# Patient Record
Sex: FEMALE | Race: BLACK OR AFRICAN AMERICAN | NOT HISPANIC OR LATINO
[De-identification: names, ages, dates, MRNs, and addresses within clinical notes are randomized per-mention and may not be internally consistent; named-entity substitution may affect disease eponyms.]

---

## 2017-02-17 DIAGNOSIS — N20.0 CALCULUS OF KIDNEY: ICD-10-CM

## 2017-03-08 ENCOUNTER — FORM ENCOUNTER (OUTPATIENT)
Age: 62
End: 2017-03-08

## 2017-03-09 ENCOUNTER — APPOINTMENT (OUTPATIENT)
Dept: CT IMAGING | Facility: IMAGING CENTER | Age: 62
End: 2017-03-09

## 2017-03-09 ENCOUNTER — OUTPATIENT (OUTPATIENT)
Dept: OUTPATIENT SERVICES | Facility: HOSPITAL | Age: 62
LOS: 1 days | End: 2017-03-09
Payer: MEDICAID

## 2017-03-09 DIAGNOSIS — N20.0 CALCULUS OF KIDNEY: ICD-10-CM

## 2017-03-09 DIAGNOSIS — Z98.89 OTHER SPECIFIED POSTPROCEDURAL STATES: Chronic | ICD-10-CM

## 2017-04-13 PROCEDURE — 74176 CT ABD & PELVIS W/O CONTRAST: CPT

## 2017-09-12 ENCOUNTER — RESULT REVIEW (OUTPATIENT)
Age: 62
End: 2017-09-12

## 2019-10-30 ENCOUNTER — RESULT REVIEW (OUTPATIENT)
Age: 64
End: 2019-10-30

## 2020-09-22 ENCOUNTER — APPOINTMENT (OUTPATIENT)
Dept: ORTHOPEDIC SURGERY | Facility: CLINIC | Age: 65
End: 2020-09-22
Payer: MEDICARE

## 2020-09-22 DIAGNOSIS — M54.12 RADICULOPATHY, CERVICAL REGION: ICD-10-CM

## 2020-09-22 PROCEDURE — 72040 X-RAY EXAM NECK SPINE 2-3 VW: CPT

## 2020-09-22 PROCEDURE — 99204 OFFICE O/P NEW MOD 45 MIN: CPT

## 2020-09-22 RX ORDER — IBUPROFEN 800 MG/1
800 TABLET, FILM COATED ORAL 3 TIMES DAILY
Qty: 90 | Refills: 1 | Status: DISCONTINUED | COMMUNITY
Start: 2020-09-22 | End: 2020-09-22

## 2021-02-19 ENCOUNTER — APPOINTMENT (OUTPATIENT)
Dept: ORTHOPEDIC SURGERY | Facility: CLINIC | Age: 66
End: 2021-02-19
Payer: MEDICARE

## 2021-02-19 PROCEDURE — 99072 ADDL SUPL MATRL&STAF TM PHE: CPT

## 2021-02-19 PROCEDURE — 99214 OFFICE O/P EST MOD 30 MIN: CPT

## 2021-06-29 ENCOUNTER — APPOINTMENT (OUTPATIENT)
Dept: ORTHOPEDIC SURGERY | Facility: CLINIC | Age: 66
End: 2021-06-29

## 2021-12-08 ENCOUNTER — RESULT REVIEW (OUTPATIENT)
Age: 66
End: 2021-12-08

## 2021-12-16 ENCOUNTER — APPOINTMENT (OUTPATIENT)
Dept: SURGERY | Facility: CLINIC | Age: 66
End: 2021-12-16
Payer: MEDICARE

## 2021-12-16 VITALS
OXYGEN SATURATION: 95 % | BODY MASS INDEX: 29.95 KG/M2 | HEIGHT: 63 IN | TEMPERATURE: 98.5 F | HEART RATE: 87 BPM | DIASTOLIC BLOOD PRESSURE: 88 MMHG | SYSTOLIC BLOOD PRESSURE: 158 MMHG | WEIGHT: 169 LBS

## 2021-12-16 PROCEDURE — 99203 OFFICE O/P NEW LOW 30 MIN: CPT

## 2021-12-16 NOTE — HISTORY OF PRESENT ILLNESS
[de-identified] : Pt is a 67 y/o F with PMH of HTN, HLD, DM who comes in following PCP referral due to abnormal gallbladder, gallstones, and biliary sludge found on gallbladder US. Pt denies nausea or vomiting. No recent fevers or chills. She reports some occasional mild RLQ pain that is not associated with any exacerbating factors.  No stool changes, although pt reports chronic constipation treated with Senna. No other abdominal pain or obstructive symptoms.

## 2021-12-16 NOTE — REVIEW OF SYSTEMS
[Constipation] : constipation [Limb Swelling] : limb swelling [Negative] : Heme/Lymph [FreeTextEntry9] : bilateral ankle swelling present at the end of day

## 2021-12-16 NOTE — PHYSICAL EXAM
[Normal Breath Sounds] : Normal breath sounds [Normal Heart Sounds] : normal heart sounds [Normal Rate and Rhythm] : normal rate and rhythm [Abdominal Masses] : No abdominal masses [Abdomen Tenderness] : ~T ~M No abdominal tenderness [No HSM] : no hepatosplenomegaly [Alert] : alert [Oriented to Person] : oriented to person [Oriented to Place] : oriented to place [Oriented to Time] : oriented to time [de-identified] : Well-appearing, NAD. [de-identified] : Moist mucous membranes. Sclera anicteric.  [de-identified] : No lymphadenopathy [de-identified] : Soft, non-distended, non-tender. BS +

## 2022-02-08 ENCOUNTER — APPOINTMENT (OUTPATIENT)
Dept: ORTHOPEDIC SURGERY | Facility: CLINIC | Age: 67
End: 2022-02-08
Payer: MEDICARE

## 2022-02-08 VITALS — WEIGHT: 169 LBS | HEIGHT: 63 IN | BODY MASS INDEX: 29.95 KG/M2

## 2022-02-08 PROCEDURE — 99214 OFFICE O/P EST MOD 30 MIN: CPT

## 2022-03-21 ENCOUNTER — RESULT REVIEW (OUTPATIENT)
Age: 67
End: 2022-03-21

## 2022-03-21 ENCOUNTER — OUTPATIENT (OUTPATIENT)
Dept: OUTPATIENT SERVICES | Facility: HOSPITAL | Age: 67
LOS: 1 days | End: 2022-03-21
Payer: MEDICARE

## 2022-03-21 ENCOUNTER — APPOINTMENT (OUTPATIENT)
Dept: MRI IMAGING | Facility: CLINIC | Age: 67
End: 2022-03-21
Payer: MEDICARE

## 2022-03-21 DIAGNOSIS — Z98.89 OTHER SPECIFIED POSTPROCEDURAL STATES: Chronic | ICD-10-CM

## 2022-03-21 DIAGNOSIS — M47.812 SPONDYLOSIS WITHOUT MYELOPATHY OR RADICULOPATHY, CERVICAL REGION: ICD-10-CM

## 2022-03-21 DIAGNOSIS — M50.30 OTHER CERVICAL DISC DEGENERATION, UNSPECIFIED CERVICAL REGION: ICD-10-CM

## 2022-03-21 PROCEDURE — 72141 MRI NECK SPINE W/O DYE: CPT

## 2022-03-21 PROCEDURE — 72141 MRI NECK SPINE W/O DYE: CPT | Mod: 26

## 2022-11-16 ENCOUNTER — APPOINTMENT (OUTPATIENT)
Dept: ORTHOPEDIC SURGERY | Facility: CLINIC | Age: 67
End: 2022-11-16

## 2022-11-16 DIAGNOSIS — M54.50 LOW BACK PAIN, UNSPECIFIED: ICD-10-CM

## 2022-11-28 ENCOUNTER — APPOINTMENT (OUTPATIENT)
Dept: ORTHOPEDIC SURGERY | Facility: CLINIC | Age: 67
End: 2022-11-28
Payer: MEDICARE

## 2022-11-28 VITALS — HEIGHT: 63 IN | BODY MASS INDEX: 29.95 KG/M2 | WEIGHT: 169 LBS

## 2022-11-28 DIAGNOSIS — M47.816 SPONDYLOSIS W/OUT MYELOPATHY OR RADICULOPATHY, CERVICAL REGION: ICD-10-CM

## 2022-11-28 DIAGNOSIS — M47.812 SPONDYLOSIS W/OUT MYELOPATHY OR RADICULOPATHY, CERVICAL REGION: ICD-10-CM

## 2022-11-28 PROCEDURE — 72100 X-RAY EXAM L-S SPINE 2/3 VWS: CPT

## 2022-11-28 PROCEDURE — 99214 OFFICE O/P EST MOD 30 MIN: CPT

## 2022-11-28 RX ORDER — INSULIN DETEMIR 100 [IU]/ML
100 INJECTION, SOLUTION SUBCUTANEOUS
Qty: 45 | Refills: 0 | Status: ACTIVE | COMMUNITY
Start: 2022-10-08

## 2022-11-28 RX ORDER — PEN NEEDLE, DIABETIC 29 G X1/2"
32G X 4 MM NEEDLE, DISPOSABLE MISCELLANEOUS
Qty: 300 | Refills: 0 | Status: ACTIVE | COMMUNITY
Start: 2022-08-26

## 2022-12-05 ENCOUNTER — APPOINTMENT (OUTPATIENT)
Dept: ORTHOPEDIC SURGERY | Facility: CLINIC | Age: 67
End: 2022-12-05

## 2022-12-08 ENCOUNTER — APPOINTMENT (OUTPATIENT)
Dept: CARDIOLOGY | Facility: CLINIC | Age: 67
End: 2022-12-08

## 2022-12-08 ENCOUNTER — NON-APPOINTMENT (OUTPATIENT)
Age: 67
End: 2022-12-08

## 2022-12-08 VITALS
OXYGEN SATURATION: 95 % | TEMPERATURE: 97.8 F | BODY MASS INDEX: 29.77 KG/M2 | HEART RATE: 87 BPM | WEIGHT: 168 LBS | SYSTOLIC BLOOD PRESSURE: 130 MMHG | DIASTOLIC BLOOD PRESSURE: 80 MMHG | HEIGHT: 63 IN

## 2022-12-08 DIAGNOSIS — Z87.09 PERSONAL HISTORY OF OTHER DISEASES OF THE RESPIRATORY SYSTEM: ICD-10-CM

## 2022-12-08 DIAGNOSIS — Z86.79 PERSONAL HISTORY OF OTHER DISEASES OF THE CIRCULATORY SYSTEM: ICD-10-CM

## 2022-12-08 PROCEDURE — 99214 OFFICE O/P EST MOD 30 MIN: CPT | Mod: 25

## 2022-12-08 PROCEDURE — 93000 ELECTROCARDIOGRAM COMPLETE: CPT

## 2022-12-08 RX ORDER — ROSUVASTATIN CALCIUM 5 MG/1
5 TABLET, FILM COATED ORAL
Qty: 90 | Refills: 0 | Status: DISCONTINUED | COMMUNITY
Start: 2022-08-19 | End: 2022-12-08

## 2022-12-20 NOTE — DISCUSSION/SUMMARY
[FreeTextEntry1] : Continue glycemic management for type 2 diabetes.  Stay on losartan plus triamterene hydrochlorothiazide for blood pressure control.  I have ordered an echocardiogram to assess LV function and valvular status.  Heart coronary CT scan with IV contrast ordered to assess underlying coronaries burden.  Heart healthy diet and as tolerated fitness with some weight loss over time encouraged.  We will call the patient with test results and followup with you for general care.  Follow-up with you for care

## 2022-12-20 NOTE — HISTORY OF PRESENT ILLNESS
[FreeTextEntry1] : Claudette is 67 years of age with history of type 2 diabetes, asthma, degenerative joint disease.  She has difficulty with ambulation secondary to cervical radiculopathy, cervical & lumbar spinal syndrome, overweight by BMI who comes in for cardiac assessment. She has an abnormal ECG possible old anterior wall MI age-indeterminate and low voltage QRS with nonspecific T wave abnormality. She also complains of palpitations.  At times, chest pain also noted.

## 2022-12-20 NOTE — CARDIOLOGY SUMMARY
[de-identified] : Sinus  Rhythm  Low voltage in precordial leads.   -Poor R-wave progression -may be secondary to pulmonary disease   consider old anterior infarct.   -  Nonspecific T-abnormality.   ABNORMAL

## 2022-12-23 ENCOUNTER — APPOINTMENT (OUTPATIENT)
Dept: CARDIOLOGY | Facility: CLINIC | Age: 67
End: 2022-12-23
Payer: MEDICARE

## 2022-12-23 PROCEDURE — 93306 TTE W/DOPPLER COMPLETE: CPT

## 2023-03-06 ENCOUNTER — APPOINTMENT (OUTPATIENT)
Dept: ORTHOPEDIC SURGERY | Facility: CLINIC | Age: 68
End: 2023-03-06

## 2023-05-05 ENCOUNTER — NON-APPOINTMENT (OUTPATIENT)
Age: 68
End: 2023-05-05

## 2023-05-05 ENCOUNTER — APPOINTMENT (OUTPATIENT)
Dept: CARDIOLOGY | Facility: CLINIC | Age: 68
End: 2023-05-05
Payer: MEDICARE

## 2023-05-05 VITALS
OXYGEN SATURATION: 96 % | SYSTOLIC BLOOD PRESSURE: 130 MMHG | HEIGHT: 63 IN | BODY MASS INDEX: 30.12 KG/M2 | HEART RATE: 87 BPM | WEIGHT: 170 LBS | DIASTOLIC BLOOD PRESSURE: 78 MMHG

## 2023-05-05 DIAGNOSIS — Z86.39 PERSONAL HISTORY OF OTHER ENDOCRINE, NUTRITIONAL AND METABOLIC DISEASE: ICD-10-CM

## 2023-05-05 PROCEDURE — 93242 EXT ECG>48HR<7D RECORDING: CPT

## 2023-05-05 PROCEDURE — 93000 ELECTROCARDIOGRAM COMPLETE: CPT

## 2023-05-05 PROCEDURE — 99214 OFFICE O/P EST MOD 30 MIN: CPT | Mod: 25

## 2023-05-05 RX ORDER — TIZANIDINE 2 MG/1
2 TABLET ORAL
Qty: 30 | Refills: 0 | Status: DISCONTINUED | COMMUNITY
Start: 2022-10-24 | End: 2023-05-05

## 2023-05-05 RX ORDER — HYDROCORTISONE 25 MG/G
2.5 CREAM TOPICAL
Qty: 28 | Refills: 0 | Status: DISCONTINUED | COMMUNITY
Start: 2022-08-27 | End: 2023-05-05

## 2023-05-05 RX ORDER — INSULIN ASPART 100 [IU]/ML
100 INJECTION, SOLUTION INTRAVENOUS; SUBCUTANEOUS
Qty: 15 | Refills: 0 | Status: DISCONTINUED | COMMUNITY
Start: 2022-08-12 | End: 2023-05-05

## 2023-05-05 RX ORDER — TIZANIDINE 4 MG/1
4 TABLET ORAL
Qty: 90 | Refills: 0 | Status: DISCONTINUED | COMMUNITY
Start: 2022-11-30 | End: 2023-05-05

## 2023-05-05 RX ORDER — GABAPENTIN 100 MG/1
100 CAPSULE ORAL
Qty: 90 | Refills: 0 | Status: DISCONTINUED | COMMUNITY
Start: 2022-08-12 | End: 2023-05-05

## 2023-05-05 RX ORDER — MELOXICAM 15 MG/1
15 TABLET ORAL
Qty: 30 | Refills: 1 | Status: DISCONTINUED | COMMUNITY
Start: 2020-09-22 | End: 2023-05-05

## 2023-05-05 RX ORDER — MELOXICAM 15 MG/1
15 TABLET ORAL
Qty: 30 | Refills: 1 | Status: DISCONTINUED | COMMUNITY
Start: 2021-02-19 | End: 2023-05-05

## 2023-05-05 RX ORDER — MELOXICAM 15 MG/1
15 TABLET ORAL DAILY
Qty: 30 | Refills: 1 | Status: DISCONTINUED | COMMUNITY
Start: 2021-02-16 | End: 2023-05-05

## 2023-05-05 RX ORDER — ASPIRIN 81 MG
81 TABLET, DELAYED RELEASE (ENTERIC COATED) ORAL DAILY
Refills: 0 | Status: ACTIVE | COMMUNITY

## 2023-05-05 RX ORDER — DICLOFENAC SODIUM 16.05 MG/ML
1.5 SOLUTION TOPICAL
Qty: 1 | Refills: 1 | Status: DISCONTINUED | COMMUNITY
Start: 2022-11-28 | End: 2023-05-05

## 2023-05-05 RX ORDER — DOCUSATE SODIUM 100 MG/1
100 CAPSULE, LIQUID FILLED ORAL
Qty: 60 | Refills: 0 | Status: DISCONTINUED | COMMUNITY
Start: 2022-06-27 | End: 2023-05-05

## 2023-05-05 RX ORDER — NAPROXEN 500 MG/1
500 TABLET ORAL
Qty: 30 | Refills: 0 | Status: DISCONTINUED | COMMUNITY
Start: 2022-10-24 | End: 2023-05-05

## 2023-05-05 RX ORDER — CYCLOBENZAPRINE HYDROCHLORIDE 5 MG/1
5 TABLET, FILM COATED ORAL
Qty: 30 | Refills: 1 | Status: DISCONTINUED | COMMUNITY
Start: 2022-11-28 | End: 2023-05-05

## 2023-05-05 RX ORDER — NEOMYCIN SULFATE, POLYMYXIN B SULFATE AND HYDROCORTISONE 3.5; 10000; 1 MG/ML; [IU]/ML; MG/ML
3.5-10000-1 SOLUTION AURICULAR (OTIC)
Qty: 10 | Refills: 0 | Status: DISCONTINUED | COMMUNITY
Start: 2022-06-27 | End: 2023-05-05

## 2023-05-05 RX ORDER — MELOXICAM 15 MG/1
15 TABLET ORAL
Qty: 30 | Refills: 1 | Status: DISCONTINUED | COMMUNITY
Start: 2022-02-08 | End: 2023-05-05

## 2023-05-22 ENCOUNTER — APPOINTMENT (OUTPATIENT)
Dept: ORTHOPEDIC SURGERY | Facility: CLINIC | Age: 68
End: 2023-05-22
Payer: MEDICARE

## 2023-05-22 VITALS — WEIGHT: 170 LBS | HEIGHT: 63 IN | BODY MASS INDEX: 30.12 KG/M2

## 2023-05-22 DIAGNOSIS — M50.30 OTHER CERVICAL DISC DEGENERATION, UNSPECIFIED CERVICAL REGION: ICD-10-CM

## 2023-05-22 DIAGNOSIS — M48.02 SPINAL STENOSIS, CERVICAL REGION: ICD-10-CM

## 2023-05-22 DIAGNOSIS — M50.20 OTHER CERVICAL DISC DISPLACEMENT, UNSPECIFIED CERVICAL REGION: ICD-10-CM

## 2023-05-22 PROCEDURE — 99214 OFFICE O/P EST MOD 30 MIN: CPT

## 2023-05-28 PROBLEM — Z86.39 HISTORY OF TYPE 2 DIABETES MELLITUS: Status: RESOLVED | Noted: 2022-12-08 | Resolved: 2023-05-28

## 2023-05-28 NOTE — HISTORY OF PRESENT ILLNESS
[FreeTextEntry1] : Claudette is 68 years of age with BMI of 30, asthma, type 2 diabetes, degenerative joint disease, abnormal ECG and intermittent palpitations of late.  She is eating generally healthy and taking medications as prescribed.

## 2023-05-28 NOTE — DISCUSSION/SUMMARY
[___ Week(s)] : in [unfilled] week(s) [FreeTextEntry3] : Stress test, 5-day Zio patch Holter monitor, 3-month follow-up visit. [FreeTextEntry1] : Continue on aspirin along with current antihypertensive regimen of triamterene hydrochlorothiazide and losartan for blood pressure management and cardiac risk reduction.  Continue on Levemir/insulin for glycemic management of type 2 diabetes.  Sodium and starch restriction again emphasized.  Heart healthy diet and lifestyle encouraged.  I will order a regular treadmill stress test to assess cardiac fitness and rule out any provocable ECG changes as well as check vital assessment to exercise.  To assess her palpitation/arrhythmia burden I have ordered a 5-day Zio patch Holter monitor.  We will call the patient with test results and followup with you for general care.  I recommended a heart healthy lifestyle including a low-saturated fat, low cholesterol diet with improved aerobic physical fitness over time for cardiovascular benefits.  Carbohydrate and sodium restriction along with weight loss over time encouraged.  We will call the patient with test results and followup with you for general care.

## 2023-05-28 NOTE — CARDIOLOGY SUMMARY
[de-identified] : Sinus  Rhythm  - occasional ectopic ventricular beat   \par Low voltage in precordial leads. \par  -  Diffuse nonspecific T-abnormality. \par \par ABNORMAL \par

## 2023-06-08 ENCOUNTER — APPOINTMENT (OUTPATIENT)
Dept: CARDIOLOGY | Facility: CLINIC | Age: 68
End: 2023-06-08

## 2023-06-12 ENCOUNTER — INPATIENT (INPATIENT)
Facility: HOSPITAL | Age: 68
LOS: 10 days | Discharge: HOME HEALTH | End: 2023-06-23
Attending: STUDENT IN AN ORGANIZED HEALTH CARE EDUCATION/TRAINING PROGRAM | Admitting: STUDENT IN AN ORGANIZED HEALTH CARE EDUCATION/TRAINING PROGRAM
Payer: MEDICARE

## 2023-06-12 VITALS
WEIGHT: 169.98 LBS | OXYGEN SATURATION: 98 % | SYSTOLIC BLOOD PRESSURE: 157 MMHG | DIASTOLIC BLOOD PRESSURE: 77 MMHG | HEART RATE: 97 BPM | HEIGHT: 63 IN | RESPIRATION RATE: 18 BRPM | TEMPERATURE: 98 F

## 2023-06-12 DIAGNOSIS — Z98.89 OTHER SPECIFIED POSTPROCEDURAL STATES: Chronic | ICD-10-CM

## 2023-06-12 LAB
ALBUMIN SERPL ELPH-MCNC: 3.8 G/DL — SIGNIFICANT CHANGE UP (ref 3.3–5)
ALP SERPL-CCNC: 80 U/L — SIGNIFICANT CHANGE UP (ref 40–120)
ALT FLD-CCNC: 19 U/L — SIGNIFICANT CHANGE UP (ref 12–78)
ANION GAP SERPL CALC-SCNC: 5 MMOL/L — SIGNIFICANT CHANGE UP (ref 5–17)
APPEARANCE UR: CLEAR — SIGNIFICANT CHANGE UP
AST SERPL-CCNC: 8 U/L — LOW (ref 15–37)
BASOPHILS # BLD AUTO: 0.03 K/UL — SIGNIFICANT CHANGE UP (ref 0–0.2)
BASOPHILS NFR BLD AUTO: 0.3 % — SIGNIFICANT CHANGE UP (ref 0–2)
BILIRUB SERPL-MCNC: 0.6 MG/DL — SIGNIFICANT CHANGE UP (ref 0.2–1.2)
BILIRUB UR-MCNC: NEGATIVE — SIGNIFICANT CHANGE UP
BUN SERPL-MCNC: 19 MG/DL — SIGNIFICANT CHANGE UP (ref 7–23)
CALCIUM SERPL-MCNC: 10.5 MG/DL — HIGH (ref 8.5–10.1)
CHLORIDE SERPL-SCNC: 102 MMOL/L — SIGNIFICANT CHANGE UP (ref 96–108)
CO2 SERPL-SCNC: 29 MMOL/L — SIGNIFICANT CHANGE UP (ref 22–31)
COLOR SPEC: YELLOW — SIGNIFICANT CHANGE UP
CREAT SERPL-MCNC: 1.49 MG/DL — HIGH (ref 0.5–1.3)
DIFF PNL FLD: NEGATIVE — SIGNIFICANT CHANGE UP
EGFR: 38 ML/MIN/1.73M2 — LOW
EOSINOPHIL # BLD AUTO: 0.04 K/UL — SIGNIFICANT CHANGE UP (ref 0–0.5)
EOSINOPHIL NFR BLD AUTO: 0.3 % — SIGNIFICANT CHANGE UP (ref 0–6)
GLUCOSE SERPL-MCNC: 291 MG/DL — HIGH (ref 70–99)
GLUCOSE UR QL: 250 MG/DL
HCT VFR BLD CALC: 34.7 % — SIGNIFICANT CHANGE UP (ref 34.5–45)
HGB BLD-MCNC: 11.3 G/DL — LOW (ref 11.5–15.5)
IMM GRANULOCYTES NFR BLD AUTO: 0.3 % — SIGNIFICANT CHANGE UP (ref 0–0.9)
KETONES UR-MCNC: ABNORMAL
LACTATE SERPL-SCNC: 1.5 MMOL/L — SIGNIFICANT CHANGE UP (ref 0.7–2)
LEUKOCYTE ESTERASE UR-ACNC: ABNORMAL
LIDOCAIN IGE QN: 43 U/L — LOW (ref 73–393)
LYMPHOCYTES # BLD AUTO: 1.68 K/UL — SIGNIFICANT CHANGE UP (ref 1–3.3)
LYMPHOCYTES # BLD AUTO: 14.4 % — SIGNIFICANT CHANGE UP (ref 13–44)
MCHC RBC-ENTMCNC: 26.2 PG — LOW (ref 27–34)
MCHC RBC-ENTMCNC: 32.6 G/DL — SIGNIFICANT CHANGE UP (ref 32–36)
MCV RBC AUTO: 80.5 FL — SIGNIFICANT CHANGE UP (ref 80–100)
MONOCYTES # BLD AUTO: 0.78 K/UL — SIGNIFICANT CHANGE UP (ref 0–0.9)
MONOCYTES NFR BLD AUTO: 6.7 % — SIGNIFICANT CHANGE UP (ref 2–14)
NEUTROPHILS # BLD AUTO: 9.07 K/UL — HIGH (ref 1.8–7.4)
NEUTROPHILS NFR BLD AUTO: 78 % — HIGH (ref 43–77)
NITRITE UR-MCNC: NEGATIVE — SIGNIFICANT CHANGE UP
NRBC # BLD: 0 /100 WBCS — SIGNIFICANT CHANGE UP (ref 0–0)
PH UR: 7 — SIGNIFICANT CHANGE UP (ref 5–8)
PLATELET # BLD AUTO: 290 K/UL — SIGNIFICANT CHANGE UP (ref 150–400)
POTASSIUM SERPL-MCNC: 5 MMOL/L — SIGNIFICANT CHANGE UP (ref 3.5–5.3)
POTASSIUM SERPL-SCNC: 5 MMOL/L — SIGNIFICANT CHANGE UP (ref 3.5–5.3)
PROT SERPL-MCNC: 8 GM/DL — SIGNIFICANT CHANGE UP (ref 6–8.3)
PROT UR-MCNC: 15 MG/DL
RBC # BLD: 4.31 M/UL — SIGNIFICANT CHANGE UP (ref 3.8–5.2)
RBC # FLD: 11.9 % — SIGNIFICANT CHANGE UP (ref 10.3–14.5)
SODIUM SERPL-SCNC: 136 MMOL/L — SIGNIFICANT CHANGE UP (ref 135–145)
SP GR SPEC: 1.01 — SIGNIFICANT CHANGE UP (ref 1.01–1.02)
UROBILINOGEN FLD QL: NEGATIVE MG/DL — SIGNIFICANT CHANGE UP
WBC # BLD: 11.63 K/UL — HIGH (ref 3.8–10.5)
WBC # FLD AUTO: 11.63 K/UL — HIGH (ref 3.8–10.5)

## 2023-06-12 PROCEDURE — 99285 EMERGENCY DEPT VISIT HI MDM: CPT

## 2023-06-12 PROCEDURE — 93010 ELECTROCARDIOGRAM REPORT: CPT

## 2023-06-12 NOTE — ED PROVIDER NOTE - OBJECTIVE STATEMENT
68-year-old female with history of hypertension diabetes presents to ER from the office of Dr. Arechiga to rule out acute abdominal pathology.  Patient states that she had been having on and off constipation symptoms for about 2 weeks but pain to the abdomen really started the last day or 2.  Denies any fevers or chills noted some nausea no vomiting thus far.

## 2023-06-12 NOTE — ED ADULT NURSE NOTE - OBJECTIVE STATEMENT
Patient A&Ox4 presents to ED sent from PCP c/o generalized abdominal pain that began last night. As per patient the pain is generalized throughout her abdomen and radiates into her chest. Of note, patient has been constipated x 2 weeks with only small, hard BM. Patient denies N/V/D, F/C, SOB, blood in stool, dysuria, hematuria. PMH HTN, DM, NKDA.

## 2023-06-12 NOTE — ED PROVIDER NOTE - NSICDXFAMILYHX_GEN_ALL_CORE_FT
FAMILY HISTORY:  Father  Still living? Unknown  Chronic obstructive pulmonary disease, Age at diagnosis: Age Unknown    Mother  Still living? No  Family history of CKD (chronic kidney disease), Age at diagnosis: Age Unknown  Type 2 diabetes mellitus, Age at diagnosis: Age Unknown

## 2023-06-12 NOTE — ED ADULT NURSE NOTE - NSFALLUNIVINTERV_ED_ALL_ED
Bed/Stretcher in lowest position, wheels locked, appropriate side rails in place/Call bell, personal items and telephone in reach/Instruct patient to call for assistance before getting out of bed/chair/stretcher/Non-slip footwear applied when patient is off stretcher/[place] to call system/Physically safe environment - no spills, clutter or unnecessary equipment/Purposeful proactive rounding/Room/bathroom lighting operational, light cord in reach

## 2023-06-12 NOTE — ED ADULT TRIAGE NOTE - CHIEF COMPLAINT QUOTE
Patient sent from PCP c/o generalized abdominal pain. Denies n/v. States she has been constipated x 2 weeks but has small bm today. Pmh htn, dm.

## 2023-06-12 NOTE — ED PROVIDER NOTE - CLINICAL SUMMARY MEDICAL DECISION MAKING FREE TEXT BOX
Patient with distention and suspicion for cholecystitis noted on CT scan and ultrasound will consult surgical team for further care of patient.    Patient evaluated by surgical service felt clinically as well as by imaging patient has acute cholecystitis would recommend admission for laparoscopic cholecystectomy.  Patient to be admitted to Dr. Gonzalez

## 2023-06-12 NOTE — ED PROVIDER NOTE - NSICDXPASTMEDICALHX_GEN_ALL_CORE_FT
PAST MEDICAL HISTORY:  Allergic asthma     DM (diabetes mellitus)     HTN (hypertension)     Hypertension     Type 2 diabetes mellitus

## 2023-06-13 DIAGNOSIS — K81.0 ACUTE CHOLECYSTITIS: ICD-10-CM

## 2023-06-13 DIAGNOSIS — N17.9 ACUTE KIDNEY FAILURE, UNSPECIFIED: ICD-10-CM

## 2023-06-13 LAB
ALBUMIN SERPL ELPH-MCNC: 3.7 G/DL — SIGNIFICANT CHANGE UP (ref 3.3–5)
ALP SERPL-CCNC: 83 U/L — SIGNIFICANT CHANGE UP (ref 40–120)
ALT FLD-CCNC: 19 U/L — SIGNIFICANT CHANGE UP (ref 12–78)
ANION GAP SERPL CALC-SCNC: 5 MMOL/L — SIGNIFICANT CHANGE UP (ref 5–17)
APTT BLD: 31 SEC — SIGNIFICANT CHANGE UP (ref 27.5–35.5)
AST SERPL-CCNC: 8 U/L — LOW (ref 15–37)
BACTERIA # UR AUTO: ABNORMAL
BILIRUB DIRECT SERPL-MCNC: 0.2 MG/DL — SIGNIFICANT CHANGE UP (ref 0–0.3)
BILIRUB INDIRECT FLD-MCNC: 0.4 MG/DL — SIGNIFICANT CHANGE UP (ref 0.2–1)
BILIRUB SERPL-MCNC: 0.6 MG/DL — SIGNIFICANT CHANGE UP (ref 0.2–1.2)
BLD GP AB SCN SERPL QL: SIGNIFICANT CHANGE UP
BUN SERPL-MCNC: 18 MG/DL — SIGNIFICANT CHANGE UP (ref 7–23)
CALCIUM SERPL-MCNC: 10.2 MG/DL — HIGH (ref 8.5–10.1)
CHLORIDE SERPL-SCNC: 102 MMOL/L — SIGNIFICANT CHANGE UP (ref 96–108)
CO2 SERPL-SCNC: 31 MMOL/L — SIGNIFICANT CHANGE UP (ref 22–31)
CREAT SERPL-MCNC: 1.42 MG/DL — HIGH (ref 0.5–1.3)
EGFR: 40 ML/MIN/1.73M2 — LOW
EPI CELLS # UR: SIGNIFICANT CHANGE UP
FLUAV AG NPH QL: SIGNIFICANT CHANGE UP
FLUBV AG NPH QL: SIGNIFICANT CHANGE UP
GLUCOSE BLDC GLUCOMTR-MCNC: 289 MG/DL — HIGH (ref 70–99)
GLUCOSE BLDC GLUCOMTR-MCNC: 309 MG/DL — HIGH (ref 70–99)
GLUCOSE SERPL-MCNC: 296 MG/DL — HIGH (ref 70–99)
HCT VFR BLD CALC: 33.4 % — LOW (ref 34.5–45)
HGB BLD-MCNC: 10.8 G/DL — LOW (ref 11.5–15.5)
INR BLD: 1.02 RATIO — SIGNIFICANT CHANGE UP (ref 0.88–1.16)
MAGNESIUM SERPL-MCNC: 2.6 MG/DL — SIGNIFICANT CHANGE UP (ref 1.6–2.6)
MCHC RBC-ENTMCNC: 26.3 PG — LOW (ref 27–34)
MCHC RBC-ENTMCNC: 32.3 G/DL — SIGNIFICANT CHANGE UP (ref 32–36)
MCV RBC AUTO: 81.5 FL — SIGNIFICANT CHANGE UP (ref 80–100)
NRBC # BLD: 0 /100 WBCS — SIGNIFICANT CHANGE UP (ref 0–0)
PHOSPHATE SERPL-MCNC: 2.7 MG/DL — SIGNIFICANT CHANGE UP (ref 2.5–4.5)
PLATELET # BLD AUTO: 281 K/UL — SIGNIFICANT CHANGE UP (ref 150–400)
POTASSIUM SERPL-MCNC: 5.1 MMOL/L — SIGNIFICANT CHANGE UP (ref 3.5–5.3)
POTASSIUM SERPL-SCNC: 5.1 MMOL/L — SIGNIFICANT CHANGE UP (ref 3.5–5.3)
PROT SERPL-MCNC: 7.8 GM/DL — SIGNIFICANT CHANGE UP (ref 6–8.3)
PROTHROM AB SERPL-ACNC: 12.1 SEC — SIGNIFICANT CHANGE UP (ref 10.5–13.4)
RBC # BLD: 4.1 M/UL — SIGNIFICANT CHANGE UP (ref 3.8–5.2)
RBC # FLD: 11.8 % — SIGNIFICANT CHANGE UP (ref 10.3–14.5)
RBC CASTS # UR COMP ASSIST: NEGATIVE /HPF — SIGNIFICANT CHANGE UP (ref 0–4)
SARS-COV-2 RNA SPEC QL NAA+PROBE: SIGNIFICANT CHANGE UP
SODIUM SERPL-SCNC: 138 MMOL/L — SIGNIFICANT CHANGE UP (ref 135–145)
WBC # BLD: 11.49 K/UL — HIGH (ref 3.8–10.5)
WBC # FLD AUTO: 11.49 K/UL — HIGH (ref 3.8–10.5)
WBC UR QL: SIGNIFICANT CHANGE UP

## 2023-06-13 PROCEDURE — 74177 CT ABD & PELVIS W/CONTRAST: CPT | Mod: 26,MA

## 2023-06-13 PROCEDURE — 99222 1ST HOSP IP/OBS MODERATE 55: CPT

## 2023-06-13 PROCEDURE — 76705 ECHO EXAM OF ABDOMEN: CPT | Mod: 26

## 2023-06-13 PROCEDURE — 71045 X-RAY EXAM CHEST 1 VIEW: CPT | Mod: 26

## 2023-06-13 RX ORDER — MORPHINE SULFATE 50 MG/1
2 CAPSULE, EXTENDED RELEASE ORAL EVERY 4 HOURS
Refills: 0 | Status: DISCONTINUED | OUTPATIENT
Start: 2023-06-13 | End: 2023-06-15

## 2023-06-13 RX ORDER — PIPERACILLIN AND TAZOBACTAM 4; .5 G/20ML; G/20ML
3.38 INJECTION, POWDER, LYOPHILIZED, FOR SOLUTION INTRAVENOUS ONCE
Refills: 0 | Status: COMPLETED | OUTPATIENT
Start: 2023-06-13 | End: 2023-06-13

## 2023-06-13 RX ORDER — SODIUM CHLORIDE 9 MG/ML
1000 INJECTION, SOLUTION INTRAVENOUS
Refills: 0 | Status: DISCONTINUED | OUTPATIENT
Start: 2023-06-13 | End: 2023-06-15

## 2023-06-13 RX ORDER — AMLODIPINE BESYLATE 2.5 MG/1
10 TABLET ORAL DAILY
Refills: 0 | Status: DISCONTINUED | OUTPATIENT
Start: 2023-06-13 | End: 2023-06-15

## 2023-06-13 RX ORDER — MORPHINE SULFATE 50 MG/1
2 CAPSULE, EXTENDED RELEASE ORAL ONCE
Refills: 0 | Status: DISCONTINUED | OUTPATIENT
Start: 2023-06-13 | End: 2023-06-13

## 2023-06-13 RX ORDER — PIPERACILLIN AND TAZOBACTAM 4; .5 G/20ML; G/20ML
3.38 INJECTION, POWDER, LYOPHILIZED, FOR SOLUTION INTRAVENOUS EVERY 8 HOURS
Refills: 0 | Status: DISCONTINUED | OUTPATIENT
Start: 2023-06-13 | End: 2023-06-15

## 2023-06-13 RX ORDER — LOSARTAN POTASSIUM 100 MG/1
50 TABLET, FILM COATED ORAL DAILY
Refills: 0 | Status: DISCONTINUED | OUTPATIENT
Start: 2023-06-13 | End: 2023-06-15

## 2023-06-13 RX ORDER — HEPARIN SODIUM 5000 [USP'U]/ML
5000 INJECTION INTRAVENOUS; SUBCUTANEOUS EVERY 12 HOURS
Refills: 0 | Status: DISCONTINUED | OUTPATIENT
Start: 2023-06-13 | End: 2023-06-15

## 2023-06-13 RX ORDER — TRIAMTERENE/HYDROCHLOROTHIAZID 75 MG-50MG
1 TABLET ORAL DAILY
Refills: 0 | Status: DISCONTINUED | OUTPATIENT
Start: 2023-06-13 | End: 2023-06-15

## 2023-06-13 RX ORDER — DEXTROSE 50 % IN WATER 50 %
15 SYRINGE (ML) INTRAVENOUS ONCE
Refills: 0 | Status: DISCONTINUED | OUTPATIENT
Start: 2023-06-13 | End: 2023-06-15

## 2023-06-13 RX ORDER — SODIUM CHLORIDE 9 MG/ML
1000 INJECTION, SOLUTION INTRAVENOUS
Refills: 0 | Status: DISCONTINUED | OUTPATIENT
Start: 2023-06-13 | End: 2023-06-14

## 2023-06-13 RX ORDER — DEXTROSE 50 % IN WATER 50 %
25 SYRINGE (ML) INTRAVENOUS ONCE
Refills: 0 | Status: DISCONTINUED | OUTPATIENT
Start: 2023-06-13 | End: 2023-06-15

## 2023-06-13 RX ORDER — DEXTROSE 50 % IN WATER 50 %
12.5 SYRINGE (ML) INTRAVENOUS ONCE
Refills: 0 | Status: DISCONTINUED | OUTPATIENT
Start: 2023-06-13 | End: 2023-06-15

## 2023-06-13 RX ORDER — ALBUTEROL 90 UG/1
2 AEROSOL, METERED ORAL EVERY 6 HOURS
Refills: 0 | Status: DISCONTINUED | OUTPATIENT
Start: 2023-06-13 | End: 2023-06-15

## 2023-06-13 RX ORDER — ACETAMINOPHEN 500 MG
975 TABLET ORAL EVERY 6 HOURS
Refills: 0 | Status: DISCONTINUED | OUTPATIENT
Start: 2023-06-13 | End: 2023-06-14

## 2023-06-13 RX ORDER — GLUCAGON INJECTION, SOLUTION 0.5 MG/.1ML
1 INJECTION, SOLUTION SUBCUTANEOUS ONCE
Refills: 0 | Status: DISCONTINUED | OUTPATIENT
Start: 2023-06-13 | End: 2023-06-15

## 2023-06-13 RX ORDER — SODIUM CHLORIDE 9 MG/ML
1000 INJECTION INTRAMUSCULAR; INTRAVENOUS; SUBCUTANEOUS ONCE
Refills: 0 | Status: COMPLETED | OUTPATIENT
Start: 2023-06-13 | End: 2023-06-13

## 2023-06-13 RX ORDER — INSULIN LISPRO 100/ML
VIAL (ML) SUBCUTANEOUS EVERY 6 HOURS
Refills: 0 | Status: DISCONTINUED | OUTPATIENT
Start: 2023-06-13 | End: 2023-06-15

## 2023-06-13 RX ORDER — ONDANSETRON 8 MG/1
4 TABLET, FILM COATED ORAL EVERY 6 HOURS
Refills: 0 | Status: DISCONTINUED | OUTPATIENT
Start: 2023-06-13 | End: 2023-06-15

## 2023-06-13 RX ADMIN — MORPHINE SULFATE 2 MILLIGRAM(S): 50 CAPSULE, EXTENDED RELEASE ORAL at 03:17

## 2023-06-13 RX ADMIN — MORPHINE SULFATE 2 MILLIGRAM(S): 50 CAPSULE, EXTENDED RELEASE ORAL at 22:10

## 2023-06-13 RX ADMIN — SODIUM CHLORIDE 1000 MILLILITER(S): 9 INJECTION INTRAMUSCULAR; INTRAVENOUS; SUBCUTANEOUS at 06:41

## 2023-06-13 RX ADMIN — SODIUM CHLORIDE 120 MILLILITER(S): 9 INJECTION, SOLUTION INTRAVENOUS at 22:41

## 2023-06-13 RX ADMIN — HEPARIN SODIUM 5000 UNIT(S): 5000 INJECTION INTRAVENOUS; SUBCUTANEOUS at 18:03

## 2023-06-13 RX ADMIN — PIPERACILLIN AND TAZOBACTAM 200 GRAM(S): 4; .5 INJECTION, POWDER, LYOPHILIZED, FOR SOLUTION INTRAVENOUS at 06:35

## 2023-06-13 RX ADMIN — MORPHINE SULFATE 2 MILLIGRAM(S): 50 CAPSULE, EXTENDED RELEASE ORAL at 21:55

## 2023-06-13 RX ADMIN — PIPERACILLIN AND TAZOBACTAM 25 GRAM(S): 4; .5 INJECTION, POWDER, LYOPHILIZED, FOR SOLUTION INTRAVENOUS at 16:09

## 2023-06-13 RX ADMIN — SODIUM CHLORIDE 1000 MILLILITER(S): 9 INJECTION INTRAMUSCULAR; INTRAVENOUS; SUBCUTANEOUS at 08:20

## 2023-06-13 RX ADMIN — PIPERACILLIN AND TAZOBACTAM 25 GRAM(S): 4; .5 INJECTION, POWDER, LYOPHILIZED, FOR SOLUTION INTRAVENOUS at 21:55

## 2023-06-13 RX ADMIN — MORPHINE SULFATE 2 MILLIGRAM(S): 50 CAPSULE, EXTENDED RELEASE ORAL at 03:35

## 2023-06-13 RX ADMIN — MORPHINE SULFATE 2 MILLIGRAM(S): 50 CAPSULE, EXTENDED RELEASE ORAL at 01:30

## 2023-06-13 RX ADMIN — MORPHINE SULFATE 2 MILLIGRAM(S): 50 CAPSULE, EXTENDED RELEASE ORAL at 01:11

## 2023-06-13 RX ADMIN — MORPHINE SULFATE 2 MILLIGRAM(S): 50 CAPSULE, EXTENDED RELEASE ORAL at 08:35

## 2023-06-13 RX ADMIN — LOSARTAN POTASSIUM 50 MILLIGRAM(S): 100 TABLET, FILM COATED ORAL at 06:35

## 2023-06-13 RX ADMIN — Medication 1 TABLET(S): at 18:02

## 2023-06-13 RX ADMIN — Medication 4: at 12:20

## 2023-06-13 RX ADMIN — SODIUM CHLORIDE 120 MILLILITER(S): 9 INJECTION, SOLUTION INTRAVENOUS at 08:32

## 2023-06-13 RX ADMIN — Medication 1 ENEMA: at 00:14

## 2023-06-13 RX ADMIN — Medication 975 MILLIGRAM(S): at 12:18

## 2023-06-13 RX ADMIN — Medication 3: at 18:03

## 2023-06-13 RX ADMIN — PIPERACILLIN AND TAZOBACTAM 3.38 GRAM(S): 4; .5 INJECTION, POWDER, LYOPHILIZED, FOR SOLUTION INTRAVENOUS at 07:10

## 2023-06-13 NOTE — CHART NOTE - NSCHARTNOTEFT_GEN_A_CORE
Pt seen and examined at bedside in no distress.  C/o epigastric pain.  Denies nausea, vomiting.    Exam  Gen- A&Ox3  Abd- Soft, nondistended, mildly TTP RUQ    Plan  Discussed case with Dr. Gonzalez.   OR canceled for today. Will continue treating with IV antibiotics and IV hydration.  Lap shawn planning.

## 2023-06-13 NOTE — PATIENT PROFILE ADULT - FALL HARM RISK - HARM RISK INTERVENTIONS

## 2023-06-13 NOTE — H&P ADULT - HISTORY OF PRESENT ILLNESS
68F PMHx HTN, asthma, DM,  x 3, palpitations c/o RUQ pain since yesterday. Pain is severe and constant. Pt was not able to eat due to pain. Patient denies fever, chills, nausea, vomiting, constipation, diarrhea, melena, hematochezia, dysuria, hematuria, chest pain, shortness of breath, dizziness, cough. Pt has had known gallstones since  but did not want surgery at that time.

## 2023-06-13 NOTE — ED ADULT NURSE REASSESSMENT NOTE - NS ED NURSE REASSESS COMMENT FT1
After enema patient states that she had a moderate bowel movement but feels "that there is still more." Patient still endorsing epigastric abdominal pain.
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
## 2023-06-13 NOTE — ED ADULT NURSE REASSESSMENT NOTE - NS ED NURSE REASSESS COMMENT FT1
Received report from RN on previous shift. Patient is A&Ox4, breathing even and unlabored breaths. Patient is admitted for acute cholecystitis, to go to OR. No acute distress noted or verbalized.

## 2023-06-13 NOTE — H&P ADULT - PROBLEM SELECTOR PLAN 1
-Admit patient  -OR planning for laparoscopic cholecystectomy, possible open   -IV antibiotics  -Pre-op labs: cbc, cmp, coags, type and screen  -NPO, IVF  -Dvt ppx  -Discussed with Dr. Gonzalez

## 2023-06-13 NOTE — H&P ADULT - ASSESSMENT
68F PMHx HTN, asthma, DM,  x 3, palpitations c/o RUQ pain since yesterday. Admitted with acute cholecystitis, LUDWIG.

## 2023-06-13 NOTE — H&P ADULT - NSHPLABSRESULTS_GEN_ALL_CORE
11.3   11.63 )-----------( 290      ( 12 Jun 2023 23:19 )             34.7   06-12    136  |  102  |  19  ----------------------------<  291<H>  5.0   |  29  |  1.49<H>    Ca    10.5<H>      12 Jun 2023 23:19    TPro  8.0  /  Alb  3.8  /  TBili  0.6  /  DBili  x   /  AST  8<L>  /  ALT  19  /  AlkPhos  80  06-12    < from: US Abdomen Upper Quadrant Right (06.13.23 @ 04:42) >    FINDINGS:  Liver: Within normal limits.  Bile ducts: Normal caliber. Common bile duct measures 6 mm.  Gallbladder: Massively distended with gallbladder sludge. The gallbladder   wall is thickened, measuring 5 mm with trace pericholecystic fluid.  Pancreas: Visualized portions are within normal limits.  Right kidney: 10.6 cm. No hydronephrosis.  Ascites: None.  IVC: Visualized portions are within normal limits.    IMPRESSION:    Massively distended gallbladder filled with sludge.  Thickened   gallbladder wall measuring 5 mm and trace pericholecystic fluid.    Findings indeterminate for acute cholecystitis.  If clinically indicated,   nuclear HIDA scan may be obtained.    < end of copied text >    < from: CT Abdomen and Pelvis w/ IV Cont (06.13.23 @ 01:43) >    FINDINGS:  LOWER CHEST: Mild dependent atelectasis. 3 mm opacity in the right lower   lobe likely distal impacted airways. Slight asymmetric prominence of the   breast tissue, left greater than right is nonspecific; correlation with   prior mammography could be helpful.    LIVER: Within normal limits.  BILE DUCTS: Normal caliber.  GALLBLADDER: Distended without definite wall thickening or surrounding   inflammatory change. Contains calculi and/or sludge.  SPLEEN: Within normal limits.  PANCREAS: Within normal limits.  ADRENALS: Within normal limits.  KIDNEYS/URETERS: Subcentimeter hypoattenuating left renal lesion too   small to characterize. Nonobstructive 2 mm right renal calculus. No   hydronephrosis.    BLADDER: Urinary bladder appears mildly thick-walled, possibly related to   underdistention.  REPRODUCTIVE ORGANS: Uterus and adnexa within normal limits.    BOWEL: No bowel obstruction. Appendix is normal.  PERITONEUM: No ascites.  VESSELS: Minimal atherosclerotic changes.  RETROPERITONEUM/LYMPH NODES: No lymphadenopathy.  ABDOMINAL WALL: Diastases recti and small fat-containing umbilical   hernia. Nonspecific minimal skin thickening of the lower ventral   abdominal wall bilaterally.  BONES: Degenerative changes.    IMPRESSION:  The gallbladder is distended, and contains internal hyperdensity which   may reflect calculi and/or sludge. No definite wall thickening or   surrounding inflammatory change. Consider ultrasound or nuclear   scintigraphy for further assessment if there is clinical suspicion for   acute cholecystitis.    Urinary bladder appears mildly thick-walled likely related to   underdistention, correlate clinically with urinalysis to assess for   infection.    No evidence of bowel obstruction.    < end of copied text >

## 2023-06-13 NOTE — ED ADULT NURSE REASSESSMENT NOTE - NS ED NURSE REASSESS COMMENT FT1
Patient resting in bed comfortably at this time. VSS, no signs of distress noted or verbalized. Patient made aware of plan of care and pending US.

## 2023-06-13 NOTE — H&P ADULT - NSHPPHYSICALEXAM_GEN_ALL_CORE
PHYSICAL EXAM:  CONSTITUTIONAL: NAD, well-developed  HEAD:  Atraumatic, Normocephalic  EYES: Conjunctiva and sclera clear  ENMT: No tonsillar erythema, exudates, or enlargement; Moist mucous membranes, No lesions  NECK: Supple, No JVD, Normal thyroid  NERVOUS SYSTEM:  Alert & Oriented X3  RESPIRATORY: Clear to auscultation bilaterally; No rales, rhonchi, wheezing  CARDIOVASCULAR: Regular rate and rhythm. S1S2  GASTROINTESTINAL: Nondistended, +BS, soft, RUQ tenderness, no guarding, no rigidity   MUSCULOSKELETAL: 2+ Peripheral Pulses, No clubbing, cyanosis, or edema

## 2023-06-14 ENCOUNTER — TRANSCRIPTION ENCOUNTER (OUTPATIENT)
Age: 68
End: 2023-06-14

## 2023-06-14 LAB
A1C WITH ESTIMATED AVERAGE GLUCOSE RESULT: 8.4 % — HIGH (ref 4–5.6)
ALBUMIN SERPL ELPH-MCNC: 3.1 G/DL — LOW (ref 3.3–5)
ALP SERPL-CCNC: 73 U/L — SIGNIFICANT CHANGE UP (ref 40–120)
ALT FLD-CCNC: 34 U/L — SIGNIFICANT CHANGE UP (ref 12–78)
ANION GAP SERPL CALC-SCNC: 2 MMOL/L — LOW (ref 5–17)
AST SERPL-CCNC: 25 U/L — SIGNIFICANT CHANGE UP (ref 15–37)
BILIRUB SERPL-MCNC: 0.7 MG/DL — SIGNIFICANT CHANGE UP (ref 0.2–1.2)
BUN SERPL-MCNC: 11 MG/DL — SIGNIFICANT CHANGE UP (ref 7–23)
CALCIUM SERPL-MCNC: 9.3 MG/DL — SIGNIFICANT CHANGE UP (ref 8.5–10.1)
CHLORIDE SERPL-SCNC: 102 MMOL/L — SIGNIFICANT CHANGE UP (ref 96–108)
CO2 SERPL-SCNC: 30 MMOL/L — SIGNIFICANT CHANGE UP (ref 22–31)
CREAT SERPL-MCNC: 1.39 MG/DL — HIGH (ref 0.5–1.3)
EGFR: 41 ML/MIN/1.73M2 — LOW
ESTIMATED AVERAGE GLUCOSE: 194 MG/DL — HIGH (ref 68–114)
GLUCOSE BLDC GLUCOMTR-MCNC: 258 MG/DL — HIGH (ref 70–99)
GLUCOSE BLDC GLUCOMTR-MCNC: 262 MG/DL — HIGH (ref 70–99)
GLUCOSE BLDC GLUCOMTR-MCNC: 305 MG/DL — HIGH (ref 70–99)
GLUCOSE BLDC GLUCOMTR-MCNC: 320 MG/DL — HIGH (ref 70–99)
GLUCOSE SERPL-MCNC: 308 MG/DL — HIGH (ref 70–99)
HCT VFR BLD CALC: 32.4 % — LOW (ref 34.5–45)
HCV AB S/CO SERPL IA: 0.08 S/CO — SIGNIFICANT CHANGE UP (ref 0–0.99)
HCV AB SERPL-IMP: SIGNIFICANT CHANGE UP
HGB BLD-MCNC: 10.4 G/DL — LOW (ref 11.5–15.5)
MAGNESIUM SERPL-MCNC: 2 MG/DL — SIGNIFICANT CHANGE UP (ref 1.6–2.6)
MCHC RBC-ENTMCNC: 25.7 PG — LOW (ref 27–34)
MCHC RBC-ENTMCNC: 32.1 G/DL — SIGNIFICANT CHANGE UP (ref 32–36)
MCV RBC AUTO: 80 FL — SIGNIFICANT CHANGE UP (ref 80–100)
NRBC # BLD: 0 /100 WBCS — SIGNIFICANT CHANGE UP (ref 0–0)
PHOSPHATE SERPL-MCNC: 2.3 MG/DL — LOW (ref 2.5–4.5)
PLATELET # BLD AUTO: 269 K/UL — SIGNIFICANT CHANGE UP (ref 150–400)
POTASSIUM SERPL-MCNC: 4.4 MMOL/L — SIGNIFICANT CHANGE UP (ref 3.5–5.3)
POTASSIUM SERPL-SCNC: 4.4 MMOL/L — SIGNIFICANT CHANGE UP (ref 3.5–5.3)
PROT SERPL-MCNC: 7 GM/DL — SIGNIFICANT CHANGE UP (ref 6–8.3)
RBC # BLD: 4.05 M/UL — SIGNIFICANT CHANGE UP (ref 3.8–5.2)
RBC # FLD: 11.7 % — SIGNIFICANT CHANGE UP (ref 10.3–14.5)
SODIUM SERPL-SCNC: 134 MMOL/L — LOW (ref 135–145)
WBC # BLD: 13.36 K/UL — HIGH (ref 3.8–10.5)
WBC # FLD AUTO: 13.36 K/UL — HIGH (ref 3.8–10.5)

## 2023-06-14 PROCEDURE — 99222 1ST HOSP IP/OBS MODERATE 55: CPT

## 2023-06-14 RX ORDER — ACETAMINOPHEN 500 MG
1000 TABLET ORAL ONCE
Refills: 0 | Status: COMPLETED | OUTPATIENT
Start: 2023-06-14 | End: 2023-06-14

## 2023-06-14 RX ORDER — SODIUM CHLORIDE 9 MG/ML
1000 INJECTION, SOLUTION INTRAVENOUS
Refills: 0 | Status: DISCONTINUED | OUTPATIENT
Start: 2023-06-14 | End: 2023-06-15

## 2023-06-14 RX ORDER — SIMETHICONE 80 MG/1
80 TABLET, CHEWABLE ORAL ONCE
Refills: 0 | Status: COMPLETED | OUTPATIENT
Start: 2023-06-14 | End: 2023-06-14

## 2023-06-14 RX ORDER — HYDROMORPHONE HYDROCHLORIDE 2 MG/ML
1 INJECTION INTRAMUSCULAR; INTRAVENOUS; SUBCUTANEOUS ONCE
Refills: 0 | Status: DISCONTINUED | OUTPATIENT
Start: 2023-06-14 | End: 2023-06-14

## 2023-06-14 RX ADMIN — Medication 975 MILLIGRAM(S): at 06:53

## 2023-06-14 RX ADMIN — PIPERACILLIN AND TAZOBACTAM 25 GRAM(S): 4; .5 INJECTION, POWDER, LYOPHILIZED, FOR SOLUTION INTRAVENOUS at 13:14

## 2023-06-14 RX ADMIN — PIPERACILLIN AND TAZOBACTAM 25 GRAM(S): 4; .5 INJECTION, POWDER, LYOPHILIZED, FOR SOLUTION INTRAVENOUS at 05:52

## 2023-06-14 RX ADMIN — Medication 975 MILLIGRAM(S): at 00:15

## 2023-06-14 RX ADMIN — MORPHINE SULFATE 2 MILLIGRAM(S): 50 CAPSULE, EXTENDED RELEASE ORAL at 11:16

## 2023-06-14 RX ADMIN — AMLODIPINE BESYLATE 10 MILLIGRAM(S): 2.5 TABLET ORAL at 05:52

## 2023-06-14 RX ADMIN — MORPHINE SULFATE 2 MILLIGRAM(S): 50 CAPSULE, EXTENDED RELEASE ORAL at 16:19

## 2023-06-14 RX ADMIN — SIMETHICONE 80 MILLIGRAM(S): 80 TABLET, CHEWABLE ORAL at 17:22

## 2023-06-14 RX ADMIN — Medication 400 MILLIGRAM(S): at 13:13

## 2023-06-14 RX ADMIN — PIPERACILLIN AND TAZOBACTAM 25 GRAM(S): 4; .5 INJECTION, POWDER, LYOPHILIZED, FOR SOLUTION INTRAVENOUS at 22:34

## 2023-06-14 RX ADMIN — Medication 85 MILLIMOLE(S): at 13:15

## 2023-06-14 RX ADMIN — Medication 3: at 00:30

## 2023-06-14 RX ADMIN — Medication 4: at 06:50

## 2023-06-14 RX ADMIN — SODIUM CHLORIDE 120 MILLILITER(S): 9 INJECTION, SOLUTION INTRAVENOUS at 22:35

## 2023-06-14 RX ADMIN — Medication 3: at 18:23

## 2023-06-14 RX ADMIN — HEPARIN SODIUM 5000 UNIT(S): 5000 INJECTION INTRAVENOUS; SUBCUTANEOUS at 17:28

## 2023-06-14 RX ADMIN — Medication 975 MILLIGRAM(S): at 05:53

## 2023-06-14 RX ADMIN — SODIUM CHLORIDE 120 MILLILITER(S): 9 INJECTION, SOLUTION INTRAVENOUS at 16:17

## 2023-06-14 RX ADMIN — Medication 975 MILLIGRAM(S): at 01:15

## 2023-06-14 RX ADMIN — HEPARIN SODIUM 5000 UNIT(S): 5000 INJECTION INTRAVENOUS; SUBCUTANEOUS at 05:52

## 2023-06-14 RX ADMIN — LOSARTAN POTASSIUM 50 MILLIGRAM(S): 100 TABLET, FILM COATED ORAL at 05:52

## 2023-06-14 RX ADMIN — HYDROMORPHONE HYDROCHLORIDE 1 MILLIGRAM(S): 2 INJECTION INTRAMUSCULAR; INTRAVENOUS; SUBCUTANEOUS at 22:33

## 2023-06-14 RX ADMIN — Medication 1 TABLET(S): at 05:52

## 2023-06-14 RX ADMIN — Medication 4: at 11:19

## 2023-06-14 NOTE — CONSULT NOTE ADULT - SUBJECTIVE AND OBJECTIVE BOX
HPI:  68F PMHx HTN, asthma, DM,  x 3, palpitations c/o RUQ pain since yesterday. Pain is severe and constant. Pt was not able to eat due to pain. Patient denies fever, chills, nausea, vomiting, constipation, diarrhea, melena, hematochezia, dysuria, hematuria, chest pain, shortness of breath, dizziness, cough. Pt has had known gallstones since  but did not want surgery at that time.  (2023 06:11)      PAST MEDICAL & SURGICAL HISTORY:  HTN (hypertension)      DM (diabetes mellitus)      Hypertension      Allergic asthma      Type 2 diabetes mellitus      S/P  section  3x      H/O  section          REVIEW OF SYSTEMS:    CONSTITUTIONAL: No fever, weight loss, or fatigue  EYES: No eye pain, visual disturbances, or discharge  ENMT:  No difficulty hearing, tinnitus, vertigo; No sinus or throat pain  NECK: No pain or stiffness  BREASTS: No pain, masses, or nipple discharge  RESPIRATORY: No cough, wheezing, chills or hemoptysis; No shortness of breath  CARDIOVASCULAR: No chest pain, palpitations, dizziness, or leg swelling  GASTROINTESTINAL: No abdominal or epigastric pain. No nausea, vomiting, or hematemesis; No diarrhea or constipation. No melena or hematochezia.  GENITOURINARY: No dysuria, frequency, hematuria, or incontinence  NEUROLOGICAL: No headaches, memory loss, loss of strength, numbness, or tremors  SKIN: No itching, burning, rashes, or lesions   LYMPH NODES: No enlarged glands  ENDOCRINE: No heat or cold intolerance; No hair loss  MUSCULOSKELETAL: No joint pain or swelling; No muscle, back, or extremity pain  PSYCHIATRIC: No depression, anxiety, mood swings, or difficulty sleeping  HEME/LYMPH: No easy bruising, or bleeding gums  ALLERGY AND IMMUNOLOGIC: No hives or eczema      MEDICATIONS  (STANDING):  amLODIPine   Tablet 10 milliGRAM(s) Oral daily  dextrose 5%. 1000 milliLiter(s) (100 mL/Hr) IV Continuous <Continuous>  dextrose 5%. 1000 milliLiter(s) (50 mL/Hr) IV Continuous <Continuous>  dextrose 50% Injectable 25 Gram(s) IV Push once  dextrose 50% Injectable 25 Gram(s) IV Push once  dextrose 50% Injectable 12.5 Gram(s) IV Push once  glucagon  Injectable 1 milliGRAM(s) IntraMuscular once  heparin   Injectable 5000 Unit(s) SubCutaneous every 12 hours  insulin lispro (ADMELOG) corrective regimen sliding scale   SubCutaneous every 6 hours  lactated ringers. 1000 milliLiter(s) (120 mL/Hr) IV Continuous <Continuous>  losartan 50 milliGRAM(s) Oral daily  piperacillin/tazobactam IVPB.. 3.375 Gram(s) IV Intermittent every 8 hours  triamterene 37.5 mG/hydrochlorothiazide 25 mG Tablet 1 Tablet(s) Oral daily    MEDICATIONS  (PRN):  albuterol    90 MICROgram(s) HFA Inhaler 2 Puff(s) Inhalation every 6 hours PRN Shortness of Breath and/or Wheezing  dextrose Oral Gel 15 Gram(s) Oral once PRN Blood Glucose LESS THAN 70 milliGRAM(s)/deciliter  morphine  - Injectable 2 milliGRAM(s) IV Push every 4 hours PRN Moderate Pain (4 - 6)  ondansetron Injectable 4 milliGRAM(s) IV Push every 6 hours PRN Nausea      Allergies    No Known Allergies    Intolerances        SOCIAL HISTORY:    FAMILY HISTORY:  Type 2 diabetes mellitus (Mother)    Family history of CKD (chronic kidney disease) (Mother)    Chronic obstructive pulmonary disease (Father)        Vital Signs Last 24 Hrs  T(C): 37.1 (2023 11:31), Max: 37.5 (2023 23:30)  T(F): 98.7 (2023 11:31), Max: 99.5 (2023 23:30)  HR: 86 (2023 11:31) (81 - 86)  BP: 144/81 (2023 11:31) (144/79 - 156/81)  BP(mean): --  RR: 18 (2023 11:31) (18 - 18)  SpO2: 97% (2023 11:31) (96% - 97%)    Parameters below as of 2023 11:31  Patient On (Oxygen Delivery Method): room air        PHYSICAL EXAM:    GENERAL: NAD, well-groomed, well-developed  HEAD:  Atraumatic, Normocephalic  EYES: EOMI, PERRLA, conjunctiva and sclera clear  ENMT: No tonsillar erythema, exudates, or enlargement; Moist mucous membranes, Good dentition, No lesions  NECK: Supple, No JVD, Normal thyroid  NERVOUS SYSTEM:  Alert & Oriented X3, Good concentration; Motor Strength 5/5 B/L upper and lower extremities; DTRs 2+ intact and symmetric  CHEST/LUNG: Clear to percussion bilaterally; No rales, rhonchi, wheezing, or rubs  HEART: Regular rate and rhythm; No murmurs, rubs, or gallops  ABDOMEN: Soft, RUQ tender , Nondistended; Bowel sounds present  EXTREMITIES:  2+ Peripheral Pulses, No clubbing, cyanosis, or edema  LYMPH: No lymphadenopathy noted  SKIN: No rashes or lesions      LABS:                        10.4   13.36 )-----------( 269      ( 2023 05:30 )             32.4     06-14    134<L>  |  102  |  11  ----------------------------<  308<H>  4.4   |  30  |  1.39<H>    Ca    9.3      2023 05:30  Phos  2.3     06-14  Mg     2.0     06-14    TPro  7.0  /  Alb  3.1<L>  /  TBili  0.7  /  DBili  x   /  AST  25  /  ALT  34  /  AlkPhos  73  06-14    PT/INR - ( 2023 06:10 )   PT: 12.1 sec;   INR: 1.02 ratio         PTT - ( 2023 06:10 )  PTT:31.0 sec  Urinalysis Basic - ( 2023 23:30 )    Color: Yellow / Appearance: Clear / S.010 / pH: x  Gluc: x / Ketone: Small  / Bili: Negative / Urobili: Negative mg/dL   Blood: x / Protein: 15 mg/dL / Nitrite: Negative   Leuk Esterase: Small / RBC: Negative /HPF / WBC 3-5   Sq Epi: x / Non Sq Epi: x / Bacteria: Few        RADIOLOGY & ADDITIONAL STUDIES:

## 2023-06-14 NOTE — CONSULT NOTE ADULT - ASSESSMENT
68F PMHx HTN, asthma, DM,  x 3, palpitations c/o RUQ pain since yesterday. Admitted with acute cholecystitis, LUDWIG.         Acute cholecystitis.   ·  Plan: -management per surgical team   -OR planning for laparoscopic cholecystectomy, possible open   -IV antibiotics  -Patient is Class 2 risk for planned surgery, no further medical work up necessary, patient may proceed to the OR  -NPO, IVF  -Dvt ppx        CKD 3  ·  Plan: IVF, monitor cr, No LUDWIG patient at baseline      DM  -sliding scale while NPO    HTN  -c/w home meds

## 2023-06-15 ENCOUNTER — RESULT REVIEW (OUTPATIENT)
Age: 68
End: 2023-06-15

## 2023-06-15 LAB
ALBUMIN SERPL ELPH-MCNC: 2.7 G/DL — LOW (ref 3.3–5)
ALP SERPL-CCNC: 82 U/L — SIGNIFICANT CHANGE UP (ref 40–120)
ALT FLD-CCNC: 65 U/L — SIGNIFICANT CHANGE UP (ref 12–78)
ANION GAP SERPL CALC-SCNC: 9 MMOL/L — SIGNIFICANT CHANGE UP (ref 5–17)
AST SERPL-CCNC: 43 U/L — HIGH (ref 15–37)
BILIRUB DIRECT SERPL-MCNC: 0.3 MG/DL — SIGNIFICANT CHANGE UP (ref 0–0.3)
BILIRUB INDIRECT FLD-MCNC: 0.8 MG/DL — SIGNIFICANT CHANGE UP (ref 0.2–1)
BILIRUB SERPL-MCNC: 1.1 MG/DL — SIGNIFICANT CHANGE UP (ref 0.2–1.2)
BUN SERPL-MCNC: 11 MG/DL — SIGNIFICANT CHANGE UP (ref 7–23)
CALCIUM SERPL-MCNC: 9.4 MG/DL — SIGNIFICANT CHANGE UP (ref 8.5–10.1)
CHLORIDE SERPL-SCNC: 100 MMOL/L — SIGNIFICANT CHANGE UP (ref 96–108)
CO2 SERPL-SCNC: 24 MMOL/L — SIGNIFICANT CHANGE UP (ref 22–31)
CREAT SERPL-MCNC: 1.57 MG/DL — HIGH (ref 0.5–1.3)
CULTURE RESULTS: SIGNIFICANT CHANGE UP
EGFR: 35 ML/MIN/1.73M2 — LOW
GLUCOSE BLDC GLUCOMTR-MCNC: 220 MG/DL — HIGH (ref 70–99)
GLUCOSE BLDC GLUCOMTR-MCNC: 233 MG/DL — HIGH (ref 70–99)
GLUCOSE BLDC GLUCOMTR-MCNC: 244 MG/DL — HIGH (ref 70–99)
GLUCOSE BLDC GLUCOMTR-MCNC: 268 MG/DL — HIGH (ref 70–99)
GLUCOSE SERPL-MCNC: 226 MG/DL — HIGH (ref 70–99)
HCT VFR BLD CALC: 33.9 % — LOW (ref 34.5–45)
HGB BLD-MCNC: 10.9 G/DL — LOW (ref 11.5–15.5)
MAGNESIUM SERPL-MCNC: 1.7 MG/DL — SIGNIFICANT CHANGE UP (ref 1.6–2.6)
MCHC RBC-ENTMCNC: 25.8 PG — LOW (ref 27–34)
MCHC RBC-ENTMCNC: 32.2 G/DL — SIGNIFICANT CHANGE UP (ref 32–36)
MCV RBC AUTO: 80.3 FL — SIGNIFICANT CHANGE UP (ref 80–100)
NRBC # BLD: 0 /100 WBCS — SIGNIFICANT CHANGE UP (ref 0–0)
PHOSPHATE SERPL-MCNC: 2.1 MG/DL — LOW (ref 2.5–4.5)
PLATELET # BLD AUTO: 179 K/UL — SIGNIFICANT CHANGE UP (ref 150–400)
POTASSIUM SERPL-MCNC: 4.1 MMOL/L — SIGNIFICANT CHANGE UP (ref 3.5–5.3)
POTASSIUM SERPL-SCNC: 4.1 MMOL/L — SIGNIFICANT CHANGE UP (ref 3.5–5.3)
PROT SERPL-MCNC: 6.8 GM/DL — SIGNIFICANT CHANGE UP (ref 6–8.3)
RBC # BLD: 4.22 M/UL — SIGNIFICANT CHANGE UP (ref 3.8–5.2)
RBC # FLD: 11.8 % — SIGNIFICANT CHANGE UP (ref 10.3–14.5)
SODIUM SERPL-SCNC: 133 MMOL/L — LOW (ref 135–145)
SPECIMEN SOURCE: SIGNIFICANT CHANGE UP
WBC # BLD: 21.52 K/UL — HIGH (ref 3.8–10.5)
WBC # FLD AUTO: 21.52 K/UL — HIGH (ref 3.8–10.5)

## 2023-06-15 PROCEDURE — 88304 TISSUE EXAM BY PATHOLOGIST: CPT | Mod: 26

## 2023-06-15 PROCEDURE — 47562 LAPAROSCOPIC CHOLECYSTECTOMY: CPT | Mod: 22

## 2023-06-15 PROCEDURE — 78226 HEPATOBILIARY SYSTEM IMAGING: CPT | Mod: 26

## 2023-06-15 PROCEDURE — 99232 SBSQ HOSP IP/OBS MODERATE 35: CPT

## 2023-06-15 PROCEDURE — 93010 ELECTROCARDIOGRAM REPORT: CPT

## 2023-06-15 PROCEDURE — 47562 LAPAROSCOPIC CHOLECYSTECTOMY: CPT | Mod: AS

## 2023-06-15 RX ORDER — ALBUTEROL 90 UG/1
2 AEROSOL, METERED ORAL EVERY 6 HOURS
Refills: 0 | Status: DISCONTINUED | OUTPATIENT
Start: 2023-06-15 | End: 2023-06-23

## 2023-06-15 RX ORDER — SODIUM CHLORIDE 9 MG/ML
1000 INJECTION, SOLUTION INTRAVENOUS
Refills: 0 | Status: DISCONTINUED | OUTPATIENT
Start: 2023-06-15 | End: 2023-06-15

## 2023-06-15 RX ORDER — TRIAMTERENE/HYDROCHLOROTHIAZID 75 MG-50MG
1 TABLET ORAL DAILY
Refills: 0 | Status: DISCONTINUED | OUTPATIENT
Start: 2023-06-15 | End: 2023-06-23

## 2023-06-15 RX ORDER — DEXTROSE 50 % IN WATER 50 %
15 SYRINGE (ML) INTRAVENOUS ONCE
Refills: 0 | Status: DISCONTINUED | OUTPATIENT
Start: 2023-06-15 | End: 2023-06-23

## 2023-06-15 RX ORDER — ONDANSETRON 8 MG/1
4 TABLET, FILM COATED ORAL ONCE
Refills: 0 | Status: DISCONTINUED | OUTPATIENT
Start: 2023-06-15 | End: 2023-06-15

## 2023-06-15 RX ORDER — SODIUM CHLORIDE 9 MG/ML
1000 INJECTION, SOLUTION INTRAVENOUS
Refills: 0 | Status: DISCONTINUED | OUTPATIENT
Start: 2023-06-15 | End: 2023-06-17

## 2023-06-15 RX ORDER — DEXTROSE 50 % IN WATER 50 %
12.5 SYRINGE (ML) INTRAVENOUS ONCE
Refills: 0 | Status: DISCONTINUED | OUTPATIENT
Start: 2023-06-15 | End: 2023-06-23

## 2023-06-15 RX ORDER — DEXTROSE 50 % IN WATER 50 %
25 SYRINGE (ML) INTRAVENOUS ONCE
Refills: 0 | Status: DISCONTINUED | OUTPATIENT
Start: 2023-06-15 | End: 2023-06-23

## 2023-06-15 RX ORDER — SODIUM CHLORIDE 9 MG/ML
1000 INJECTION, SOLUTION INTRAVENOUS
Refills: 0 | Status: DISCONTINUED | OUTPATIENT
Start: 2023-06-15 | End: 2023-06-23

## 2023-06-15 RX ORDER — AMLODIPINE BESYLATE 2.5 MG/1
10 TABLET ORAL DAILY
Refills: 0 | Status: DISCONTINUED | OUTPATIENT
Start: 2023-06-15 | End: 2023-06-23

## 2023-06-15 RX ORDER — HEPARIN SODIUM 5000 [USP'U]/ML
5000 INJECTION INTRAVENOUS; SUBCUTANEOUS EVERY 8 HOURS
Refills: 0 | Status: DISCONTINUED | OUTPATIENT
Start: 2023-06-15 | End: 2023-06-20

## 2023-06-15 RX ORDER — ONDANSETRON 8 MG/1
4 TABLET, FILM COATED ORAL EVERY 6 HOURS
Refills: 0 | Status: DISCONTINUED | OUTPATIENT
Start: 2023-06-15 | End: 2023-06-23

## 2023-06-15 RX ORDER — LOSARTAN POTASSIUM 100 MG/1
50 TABLET, FILM COATED ORAL DAILY
Refills: 0 | Status: DISCONTINUED | OUTPATIENT
Start: 2023-06-15 | End: 2023-06-23

## 2023-06-15 RX ORDER — HYDROMORPHONE HYDROCHLORIDE 2 MG/ML
0.5 INJECTION INTRAMUSCULAR; INTRAVENOUS; SUBCUTANEOUS
Refills: 0 | Status: DISCONTINUED | OUTPATIENT
Start: 2023-06-15 | End: 2023-06-15

## 2023-06-15 RX ORDER — OXYCODONE HYDROCHLORIDE 5 MG/1
5 TABLET ORAL EVERY 6 HOURS
Refills: 0 | Status: DISCONTINUED | OUTPATIENT
Start: 2023-06-15 | End: 2023-06-16

## 2023-06-15 RX ORDER — INSULIN LISPRO 100/ML
VIAL (ML) SUBCUTANEOUS EVERY 6 HOURS
Refills: 0 | Status: DISCONTINUED | OUTPATIENT
Start: 2023-06-15 | End: 2023-06-23

## 2023-06-15 RX ORDER — MORPHINE SULFATE 50 MG/1
2 CAPSULE, EXTENDED RELEASE ORAL ONCE
Refills: 0 | Status: DISCONTINUED | OUTPATIENT
Start: 2023-06-15 | End: 2023-06-15

## 2023-06-15 RX ORDER — ACETAMINOPHEN 500 MG
1000 TABLET ORAL ONCE
Refills: 0 | Status: COMPLETED | OUTPATIENT
Start: 2023-06-15 | End: 2023-06-15

## 2023-06-15 RX ORDER — GLUCAGON INJECTION, SOLUTION 0.5 MG/.1ML
1 INJECTION, SOLUTION SUBCUTANEOUS ONCE
Refills: 0 | Status: DISCONTINUED | OUTPATIENT
Start: 2023-06-15 | End: 2023-06-23

## 2023-06-15 RX ORDER — MORPHINE SULFATE 50 MG/1
2 CAPSULE, EXTENDED RELEASE ORAL EVERY 4 HOURS
Refills: 0 | Status: DISCONTINUED | OUTPATIENT
Start: 2023-06-15 | End: 2023-06-18

## 2023-06-15 RX ORDER — PIPERACILLIN AND TAZOBACTAM 4; .5 G/20ML; G/20ML
3.38 INJECTION, POWDER, LYOPHILIZED, FOR SOLUTION INTRAVENOUS EVERY 8 HOURS
Refills: 0 | Status: COMPLETED | OUTPATIENT
Start: 2023-06-15 | End: 2023-06-19

## 2023-06-15 RX ADMIN — MORPHINE SULFATE 2 MILLIGRAM(S): 50 CAPSULE, EXTENDED RELEASE ORAL at 18:53

## 2023-06-15 RX ADMIN — HEPARIN SODIUM 5000 UNIT(S): 5000 INJECTION INTRAVENOUS; SUBCUTANEOUS at 22:39

## 2023-06-15 RX ADMIN — SODIUM CHLORIDE 75 MILLILITER(S): 9 INJECTION, SOLUTION INTRAVENOUS at 17:50

## 2023-06-15 RX ADMIN — Medication 2: at 00:40

## 2023-06-15 RX ADMIN — SODIUM CHLORIDE 120 MILLILITER(S): 9 INJECTION, SOLUTION INTRAVENOUS at 22:39

## 2023-06-15 RX ADMIN — PIPERACILLIN AND TAZOBACTAM 25 GRAM(S): 4; .5 INJECTION, POWDER, LYOPHILIZED, FOR SOLUTION INTRAVENOUS at 06:12

## 2023-06-15 RX ADMIN — LOSARTAN POTASSIUM 50 MILLIGRAM(S): 100 TABLET, FILM COATED ORAL at 06:12

## 2023-06-15 RX ADMIN — Medication 1 TABLET(S): at 06:11

## 2023-06-15 RX ADMIN — Medication 2: at 11:30

## 2023-06-15 RX ADMIN — MORPHINE SULFATE 2 MILLIGRAM(S): 50 CAPSULE, EXTENDED RELEASE ORAL at 09:35

## 2023-06-15 RX ADMIN — Medication 2: at 06:14

## 2023-06-15 RX ADMIN — Medication 400 MILLIGRAM(S): at 22:40

## 2023-06-15 RX ADMIN — PIPERACILLIN AND TAZOBACTAM 25 GRAM(S): 4; .5 INJECTION, POWDER, LYOPHILIZED, FOR SOLUTION INTRAVENOUS at 22:39

## 2023-06-15 RX ADMIN — AMLODIPINE BESYLATE 10 MILLIGRAM(S): 2.5 TABLET ORAL at 06:14

## 2023-06-15 NOTE — CHART NOTE - NSCHARTNOTEFT_GEN_A_CORE
Requested by Dr. Gonzalez to assume care for patient.     Patient seen and examined at bedside. Continued epigastric and RUQ pain associated with nausea without vomiting.     TTP without signs of peritonitis.     CBC, LFTs, Electrolyes reviewed.    US, CT, HIDA reviewed.     67yo woman with acute cholecystitis.   - To OR for lap shawn.

## 2023-06-16 LAB
ALBUMIN SERPL ELPH-MCNC: 2.3 G/DL — LOW (ref 3.3–5)
ALP SERPL-CCNC: 81 U/L — SIGNIFICANT CHANGE UP (ref 40–120)
ALT FLD-CCNC: 101 U/L — HIGH (ref 12–78)
ANION GAP SERPL CALC-SCNC: 8 MMOL/L — SIGNIFICANT CHANGE UP (ref 5–17)
AST SERPL-CCNC: 77 U/L — HIGH (ref 15–37)
BILIRUB DIRECT SERPL-MCNC: 0.3 MG/DL — SIGNIFICANT CHANGE UP (ref 0–0.3)
BILIRUB INDIRECT FLD-MCNC: 0.6 MG/DL — SIGNIFICANT CHANGE UP (ref 0.2–1)
BILIRUB SERPL-MCNC: 0.9 MG/DL — SIGNIFICANT CHANGE UP (ref 0.2–1.2)
BUN SERPL-MCNC: 22 MG/DL — SIGNIFICANT CHANGE UP (ref 7–23)
CALCIUM SERPL-MCNC: 8.9 MG/DL — SIGNIFICANT CHANGE UP (ref 8.5–10.1)
CHLORIDE SERPL-SCNC: 98 MMOL/L — SIGNIFICANT CHANGE UP (ref 96–108)
CO2 SERPL-SCNC: 27 MMOL/L — SIGNIFICANT CHANGE UP (ref 22–31)
CREAT SERPL-MCNC: 1.88 MG/DL — HIGH (ref 0.5–1.3)
EGFR: 28 ML/MIN/1.73M2 — LOW
GLUCOSE BLDC GLUCOMTR-MCNC: 267 MG/DL — HIGH (ref 70–99)
GLUCOSE BLDC GLUCOMTR-MCNC: 292 MG/DL — HIGH (ref 70–99)
GLUCOSE BLDC GLUCOMTR-MCNC: 339 MG/DL — HIGH (ref 70–99)
GLUCOSE BLDC GLUCOMTR-MCNC: 342 MG/DL — HIGH (ref 70–99)
GLUCOSE BLDC GLUCOMTR-MCNC: 352 MG/DL — HIGH (ref 70–99)
GLUCOSE BLDC GLUCOMTR-MCNC: 406 MG/DL — HIGH (ref 70–99)
GLUCOSE BLDC GLUCOMTR-MCNC: 417 MG/DL — HIGH (ref 70–99)
GLUCOSE SERPL-MCNC: 368 MG/DL — HIGH (ref 70–99)
HCT VFR BLD CALC: 30 % — LOW (ref 34.5–45)
HGB BLD-MCNC: 9.8 G/DL — LOW (ref 11.5–15.5)
MAGNESIUM SERPL-MCNC: 1.7 MG/DL — SIGNIFICANT CHANGE UP (ref 1.6–2.6)
MCHC RBC-ENTMCNC: 25.7 PG — LOW (ref 27–34)
MCHC RBC-ENTMCNC: 32.7 G/DL — SIGNIFICANT CHANGE UP (ref 32–36)
MCV RBC AUTO: 78.7 FL — LOW (ref 80–100)
NRBC # BLD: 0 /100 WBCS — SIGNIFICANT CHANGE UP (ref 0–0)
PHOSPHATE SERPL-MCNC: 2.3 MG/DL — LOW (ref 2.5–4.5)
PLATELET # BLD AUTO: 293 K/UL — SIGNIFICANT CHANGE UP (ref 150–400)
POTASSIUM SERPL-MCNC: 4.1 MMOL/L — SIGNIFICANT CHANGE UP (ref 3.5–5.3)
POTASSIUM SERPL-SCNC: 4.1 MMOL/L — SIGNIFICANT CHANGE UP (ref 3.5–5.3)
PROT SERPL-MCNC: 6.6 GM/DL — SIGNIFICANT CHANGE UP (ref 6–8.3)
RBC # BLD: 3.81 M/UL — SIGNIFICANT CHANGE UP (ref 3.8–5.2)
RBC # FLD: 11.9 % — SIGNIFICANT CHANGE UP (ref 10.3–14.5)
SODIUM SERPL-SCNC: 133 MMOL/L — LOW (ref 135–145)
WBC # BLD: 15.1 K/UL — HIGH (ref 3.8–10.5)
WBC # FLD AUTO: 15.1 K/UL — HIGH (ref 3.8–10.5)

## 2023-06-16 PROCEDURE — 99232 SBSQ HOSP IP/OBS MODERATE 35: CPT

## 2023-06-16 RX ORDER — INSULIN GLARGINE 100 [IU]/ML
15 INJECTION, SOLUTION SUBCUTANEOUS ONCE
Refills: 0 | Status: COMPLETED | OUTPATIENT
Start: 2023-06-16 | End: 2023-06-16

## 2023-06-16 RX ORDER — SODIUM CHLORIDE 9 MG/ML
1000 INJECTION, SOLUTION INTRAVENOUS ONCE
Refills: 0 | Status: COMPLETED | OUTPATIENT
Start: 2023-06-16 | End: 2023-06-16

## 2023-06-16 RX ORDER — INSULIN LISPRO 100/ML
4 VIAL (ML) SUBCUTANEOUS
Refills: 0 | Status: DISCONTINUED | OUTPATIENT
Start: 2023-06-16 | End: 2023-06-17

## 2023-06-16 RX ORDER — INSULIN GLARGINE 100 [IU]/ML
15 INJECTION, SOLUTION SUBCUTANEOUS AT BEDTIME
Refills: 0 | Status: DISCONTINUED | OUTPATIENT
Start: 2023-06-17 | End: 2023-06-17

## 2023-06-16 RX ADMIN — MORPHINE SULFATE 2 MILLIGRAM(S): 50 CAPSULE, EXTENDED RELEASE ORAL at 13:35

## 2023-06-16 RX ADMIN — Medication 1 TABLET(S): at 05:57

## 2023-06-16 RX ADMIN — AMLODIPINE BESYLATE 10 MILLIGRAM(S): 2.5 TABLET ORAL at 05:57

## 2023-06-16 RX ADMIN — INSULIN GLARGINE 15 UNIT(S): 100 INJECTION, SOLUTION SUBCUTANEOUS at 09:53

## 2023-06-16 RX ADMIN — PIPERACILLIN AND TAZOBACTAM 25 GRAM(S): 4; .5 INJECTION, POWDER, LYOPHILIZED, FOR SOLUTION INTRAVENOUS at 06:04

## 2023-06-16 RX ADMIN — HEPARIN SODIUM 5000 UNIT(S): 5000 INJECTION INTRAVENOUS; SUBCUTANEOUS at 14:46

## 2023-06-16 RX ADMIN — SODIUM CHLORIDE 1000 MILLILITER(S): 9 INJECTION, SOLUTION INTRAVENOUS at 09:45

## 2023-06-16 RX ADMIN — PIPERACILLIN AND TAZOBACTAM 25 GRAM(S): 4; .5 INJECTION, POWDER, LYOPHILIZED, FOR SOLUTION INTRAVENOUS at 14:46

## 2023-06-16 RX ADMIN — HEPARIN SODIUM 5000 UNIT(S): 5000 INJECTION INTRAVENOUS; SUBCUTANEOUS at 22:03

## 2023-06-16 RX ADMIN — Medication 4 UNIT(S): at 19:09

## 2023-06-16 RX ADMIN — Medication 4 UNIT(S): at 11:39

## 2023-06-16 RX ADMIN — Medication 6: at 00:54

## 2023-06-16 RX ADMIN — MORPHINE SULFATE 2 MILLIGRAM(S): 50 CAPSULE, EXTENDED RELEASE ORAL at 18:43

## 2023-06-16 RX ADMIN — Medication 6: at 11:39

## 2023-06-16 RX ADMIN — Medication 3: at 19:09

## 2023-06-16 RX ADMIN — PIPERACILLIN AND TAZOBACTAM 25 GRAM(S): 4; .5 INJECTION, POWDER, LYOPHILIZED, FOR SOLUTION INTRAVENOUS at 22:04

## 2023-06-16 RX ADMIN — SODIUM CHLORIDE 120 MILLILITER(S): 9 INJECTION, SOLUTION INTRAVENOUS at 18:49

## 2023-06-16 RX ADMIN — MORPHINE SULFATE 2 MILLIGRAM(S): 50 CAPSULE, EXTENDED RELEASE ORAL at 18:28

## 2023-06-16 RX ADMIN — OXYCODONE HYDROCHLORIDE 5 MILLIGRAM(S): 5 TABLET ORAL at 09:14

## 2023-06-16 RX ADMIN — Medication 5: at 06:00

## 2023-06-16 RX ADMIN — LOSARTAN POTASSIUM 50 MILLIGRAM(S): 100 TABLET, FILM COATED ORAL at 05:57

## 2023-06-16 RX ADMIN — HEPARIN SODIUM 5000 UNIT(S): 5000 INJECTION INTRAVENOUS; SUBCUTANEOUS at 05:57

## 2023-06-16 NOTE — MEDICAL STUDENT PROGRESS NOTE(EDUCATION) - PLAN 3
- Cr (1.88<1.57) is elevated - Cr (1.88<1.57) is elevated and GFR (28<35) is reduced.  - Urine output is low (~23cc/hr; 90CC 4hr after emptying)  - IV fluid bolus provided today  - Continue to monitor with daily CMP

## 2023-06-17 ENCOUNTER — TRANSCRIPTION ENCOUNTER (OUTPATIENT)
Age: 68
End: 2023-06-17

## 2023-06-17 LAB
ANION GAP SERPL CALC-SCNC: 6 MMOL/L — SIGNIFICANT CHANGE UP (ref 5–17)
BUN SERPL-MCNC: 30 MG/DL — HIGH (ref 7–23)
CALCIUM SERPL-MCNC: 9.5 MG/DL — SIGNIFICANT CHANGE UP (ref 8.5–10.1)
CHLORIDE SERPL-SCNC: 104 MMOL/L — SIGNIFICANT CHANGE UP (ref 96–108)
CO2 SERPL-SCNC: 24 MMOL/L — SIGNIFICANT CHANGE UP (ref 22–31)
CREAT SERPL-MCNC: 1.72 MG/DL — HIGH (ref 0.5–1.3)
EGFR: 32 ML/MIN/1.73M2 — LOW
GLUCOSE BLDC GLUCOMTR-MCNC: 311 MG/DL — HIGH (ref 70–99)
GLUCOSE BLDC GLUCOMTR-MCNC: 345 MG/DL — HIGH (ref 70–99)
GLUCOSE BLDC GLUCOMTR-MCNC: 366 MG/DL — HIGH (ref 70–99)
GLUCOSE BLDC GLUCOMTR-MCNC: 374 MG/DL — HIGH (ref 70–99)
GLUCOSE SERPL-MCNC: 324 MG/DL — HIGH (ref 70–99)
HCT VFR BLD CALC: 29.4 % — LOW (ref 34.5–45)
HGB BLD-MCNC: 9.4 G/DL — LOW (ref 11.5–15.5)
MAGNESIUM SERPL-MCNC: 2.2 MG/DL — SIGNIFICANT CHANGE UP (ref 1.6–2.6)
MCHC RBC-ENTMCNC: 25.8 PG — LOW (ref 27–34)
MCHC RBC-ENTMCNC: 32 G/DL — SIGNIFICANT CHANGE UP (ref 32–36)
MCV RBC AUTO: 80.8 FL — SIGNIFICANT CHANGE UP (ref 80–100)
NRBC # BLD: 0 /100 WBCS — SIGNIFICANT CHANGE UP (ref 0–0)
PHOSPHATE SERPL-MCNC: 1.8 MG/DL — LOW (ref 2.5–4.5)
PLATELET # BLD AUTO: 238 K/UL — SIGNIFICANT CHANGE UP (ref 150–400)
POTASSIUM SERPL-MCNC: 4.9 MMOL/L — SIGNIFICANT CHANGE UP (ref 3.5–5.3)
POTASSIUM SERPL-SCNC: 4.9 MMOL/L — SIGNIFICANT CHANGE UP (ref 3.5–5.3)
RBC # BLD: 3.64 M/UL — LOW (ref 3.8–5.2)
RBC # FLD: 11.9 % — SIGNIFICANT CHANGE UP (ref 10.3–14.5)
SODIUM SERPL-SCNC: 134 MMOL/L — LOW (ref 135–145)
WBC # BLD: 15.23 K/UL — HIGH (ref 3.8–10.5)
WBC # FLD AUTO: 15.23 K/UL — HIGH (ref 3.8–10.5)

## 2023-06-17 PROCEDURE — 99232 SBSQ HOSP IP/OBS MODERATE 35: CPT

## 2023-06-17 RX ORDER — INSULIN LISPRO 100/ML
6 VIAL (ML) SUBCUTANEOUS
Refills: 0 | Status: DISCONTINUED | OUTPATIENT
Start: 2023-06-17 | End: 2023-06-23

## 2023-06-17 RX ORDER — OXYCODONE HYDROCHLORIDE 5 MG/1
1 TABLET ORAL
Qty: 20 | Refills: 0
Start: 2023-06-17

## 2023-06-17 RX ORDER — INSULIN GLARGINE 100 [IU]/ML
20 INJECTION, SOLUTION SUBCUTANEOUS AT BEDTIME
Refills: 0 | Status: DISCONTINUED | OUTPATIENT
Start: 2023-06-17 | End: 2023-06-23

## 2023-06-17 RX ORDER — INSULIN GLARGINE 100 [IU]/ML
25 INJECTION, SOLUTION SUBCUTANEOUS ONCE
Refills: 0 | Status: COMPLETED | OUTPATIENT
Start: 2023-06-17 | End: 2023-06-17

## 2023-06-17 RX ADMIN — MORPHINE SULFATE 2 MILLIGRAM(S): 50 CAPSULE, EXTENDED RELEASE ORAL at 18:43

## 2023-06-17 RX ADMIN — MORPHINE SULFATE 2 MILLIGRAM(S): 50 CAPSULE, EXTENDED RELEASE ORAL at 22:52

## 2023-06-17 RX ADMIN — HEPARIN SODIUM 5000 UNIT(S): 5000 INJECTION INTRAVENOUS; SUBCUTANEOUS at 21:47

## 2023-06-17 RX ADMIN — Medication 5: at 11:43

## 2023-06-17 RX ADMIN — MORPHINE SULFATE 2 MILLIGRAM(S): 50 CAPSULE, EXTENDED RELEASE ORAL at 11:42

## 2023-06-17 RX ADMIN — HEPARIN SODIUM 5000 UNIT(S): 5000 INJECTION INTRAVENOUS; SUBCUTANEOUS at 05:39

## 2023-06-17 RX ADMIN — PIPERACILLIN AND TAZOBACTAM 25 GRAM(S): 4; .5 INJECTION, POWDER, LYOPHILIZED, FOR SOLUTION INTRAVENOUS at 05:39

## 2023-06-17 RX ADMIN — Medication 4 UNIT(S): at 11:43

## 2023-06-17 RX ADMIN — PIPERACILLIN AND TAZOBACTAM 25 GRAM(S): 4; .5 INJECTION, POWDER, LYOPHILIZED, FOR SOLUTION INTRAVENOUS at 14:02

## 2023-06-17 RX ADMIN — PIPERACILLIN AND TAZOBACTAM 25 GRAM(S): 4; .5 INJECTION, POWDER, LYOPHILIZED, FOR SOLUTION INTRAVENOUS at 21:46

## 2023-06-17 RX ADMIN — Medication 4 UNIT(S): at 07:45

## 2023-06-17 RX ADMIN — INSULIN GLARGINE 25 UNIT(S): 100 INJECTION, SOLUTION SUBCUTANEOUS at 11:43

## 2023-06-17 RX ADMIN — Medication 6 UNIT(S): at 16:22

## 2023-06-17 RX ADMIN — Medication 4: at 07:44

## 2023-06-17 RX ADMIN — SODIUM CHLORIDE 120 MILLILITER(S): 9 INJECTION, SOLUTION INTRAVENOUS at 03:26

## 2023-06-17 RX ADMIN — INSULIN GLARGINE 20 UNIT(S): 100 INJECTION, SOLUTION SUBCUTANEOUS at 21:47

## 2023-06-17 NOTE — DISCHARGE NOTE PROVIDER - CARE PROVIDER_API CALL
Beni Hagen  Surgery  [address] [facility], [address]  [place] [address]  Phone: [phone]  Fax: [phone]  Follow Up Time: 2 weeks   Chris Granados  Surgery  [address] [facility], [address]  [place] [address]  Phone: [phone]  Fax: [phone]  Follow Up Time: 2 weeks    Apollo Goel  Interventional Radiology and Diagnostic Radiology  [address] [place] [address]  Phone: [phone]  Fax: [phone]  Follow Up Time: 2 weeks   Apollo Goel  Interventional Radiology and Diagnostic Radiology  [address] [place] [address]  Phone: [phone]  Fax: [phone]  Follow Up Time: 2 weeks    Tony Gallardo  Surgery  [address] [place] [address], [address]  [place] [address]  Phone: [phone]  Fax: [phone]  Follow Up Time: 1-3 days

## 2023-06-17 NOTE — DISCHARGE NOTE PROVIDER - HOSPITAL COURSE
68 year old female presented to hospital with acute cholecystitis, underwent laparoscopic cholecystectomy as well as treatment with IV abx. Patient tolerated procedure well, is having bowel function and tolerating diet. Vital signs and labs are stable cleared for discharge home. 68 year old female presented to hospital with acute cholecystitis, underwent laparoscopic cholecystectomy as well as treatment with IV abx. Patient complaining of abdominal pain 5 days post op and was found to have 68 year old female presented to hospital with acute cholecystitis, underwent laparoscopic cholecystectomy as well as treatment with IV abx. Patient complaining of abdominal pain 5 days post op and was found to have a biloma, which was drained by IR team on 6/21. Remainder of hospital course unremarkable following drainage. Patient stable for discharge per surgical standpoint on PO antibiotics.

## 2023-06-17 NOTE — DISCHARGE NOTE PROVIDER - NSDCFUADDINST_GEN_ALL_CORE_FT
Follow up in 7-10 days with Dr. Hagen. Please call the office to make an appointment. Activity as tolerated. Rest as needed. You may eat a regular diet. Do not lift anything heavier than 10 pounds. You may take motrin or tylenol for mild pain as needed, in addition to prescribed narcotic medication. Do not drive while taking narcotic pain medication. You may take over the counter stool softeners as needed. Call for any fever over 101, nausea, vomiting, severe pain, no passing of gas or bowel movement. You may shower and pat dry abdomen. Leave the white steri strips in place; they will fall off in 5-7 days.  Follow up in 7-10 days with Dr. Granados. Please call the office to make an appointment. Activity as tolerated. Rest as needed. You may eat a regular diet. Do not lift anything heavier than 10 pounds. You may take tylenol for mild pain as needed. You may take over the counter stool softeners as needed. Call for any fever over 101, nausea, vomiting, severe pain, no passing of gas or bowel movement. You may shower and pat dry abdomen. Leave the white steri strips in place; they will fall off in 5-7 days.  Follow up in 7-10 days with Dr. Granados. Please call the office to make an appointment. Activity as tolerated. Rest as needed. You may eat a regular diet. Do not lift anything heavier than 10 pounds. You may take tylenol for mild pain as needed. You may take over the counter stool softeners as needed. Call for any fever over 101, nausea, vomiting, severe pain, no passing of gas or bowel movement. You may shower and pat dry abdomen. Leave the white steri strips in place; they will fall off in 5-7 days.     Please flush drain daily with 3ml of normal saline then return to gravity drainage, record output daily.

## 2023-06-17 NOTE — DISCHARGE NOTE PROVIDER - NSDCFUSCHEDAPPT_GEN_ALL_CORE_FT
[facility]  CATSCAN [address]  Scheduled Appointment: 06/22/2023    [facility]  CARDIOLOGY [address]  Scheduled Appointment: 08/01/2023     [facility] Physician [facility]  CARDIOLOGY [address] [facility]  Scheduled Appointment: 08/01/2023     Tony Gallardo  [facility]  GENSUR [address]   Scheduled Appointment: 06/26/2023    [facility]  CARDIOLOGY [address]  Scheduled Appointment: 08/01/2023

## 2023-06-17 NOTE — DISCHARGE NOTE PROVIDER - NSDCCPTREATMENT_GEN_ALL_CORE_FT
PRINCIPAL PROCEDURE  Procedure: Laparoscopic cholecystectomy  Findings and Treatment:      PRINCIPAL PROCEDURE  Procedure: Laparoscopic cholecystectomy  Findings and Treatment:       SECONDARY PROCEDURE  Procedure: Percutaneous catheter drainage of peritoneal fluid collection with imaging guidance  Findings and Treatment: You had a biloma drain placed by Dr. Goel on 6/21 in Interventional Radiology (IR).   Monitor site for any sign or symptoms of infection (painful red skin, green/ yellow foul smelling discharge from the insertion site, fever, chills, leakage around drain site).   Flush drain with 10 mL daily. If you meet resistance upon flushing, STOP and contact IR.   Empty drainage bag or bulb daily and record output. Once output is <10mL in a 24hr period or if there is a sudden decrease in output please contact IR to schedule an appointment.  Drain care:  -Disconnect tubing (tube attached to bag/ bulb)  from the catheter (catheter going into skin)  -Clean catheter with alcohol swab  -Twist on the flush syringe to the catheter (be sure to push the air out of syringe)  -Flush catheter with 10 mL (DO NOT pull back on syringe). If you meet resistance upon flushing, STOP and contact IR.   -Disconnect syringe from catheter and reconnect drainage bag or bulb.  Dressing care:  -Wash hands thoroughly with water and soap for at least 20 seconds.   -take off old dressing and clean around drain site with gauze soaked with warm water  -After cleaning the site, dry and replace dressing with a new gauze and tegaderm.   -Place one piece of gauze underneath the drain and another piece of gauze on top of drain.   -Apply tegaderm (clear dressing) on top.  -Change dressing every 3 days or when soiled  Follow up in 1 week for drain evaluation. Also follow up with surgeon to determine if you need further surgical intervention. Call to make appt at [phone].  If you need to reach us with any questions regarding your drain, please call us at [phone].

## 2023-06-17 NOTE — DISCHARGE NOTE PROVIDER - NSDCFUADDAPPT_GEN_ALL_CORE_FT
Please call office to make an appointment with Dr. Granados and Dr. Goel in two weeks Please call office to make an appointment with Dr. Gallardo on Monday, 6/26/23, at 9 AM. The office will be expecting your call.     Please call and make an appointment to follow-up with Dr. Goel in 2 weeks.

## 2023-06-17 NOTE — DISCHARGE NOTE PROVIDER - PROVIDER TOKENS
PROVIDER:[TOKEN:[[id number]],FOLLOWUP:[2 weeks]] PROVIDER:[TOKEN:[[id number]],FOLLOWUP:[2 weeks]],PROVIDER:[TOKEN:[[id number]],FOLLOWUP:[2 weeks]] PROVIDER:[TOKEN:[[id number]],FOLLOWUP:[2 weeks]],PROVIDER:[TOKEN:[[id number]],FOLLOWUP:[1-3 days]]

## 2023-06-17 NOTE — DISCHARGE NOTE PROVIDER - NSDCMRMEDTOKEN_GEN_ALL_CORE_FT
amoxicillin-clavulanate 875 mg-125 mg oral tablet: 1 tab(s) orally 2 times a day  glimepiride 2 mg oral tablet: 1 tab(s) orally once a day  glimepiride 4 mg oral tablet: 1 tab(s) orally 2 times a day  hydrochlorothiazide 12.5 mg oral tablet: 1 tab(s) orally once a day  hydrochlorothiazide-triamterene 25 mg-37.5 mg oral tablet: 1 tab(s) orally once a day  insulin: 15 unit(s) subcutaneous   Levemir FlexTouch 100 units/mL subcutaneous solution: 15 unit(s) subcutaneous once a day (at bedtime)  losartan 50 mg oral tablet: 1 tab(s) orally once a day  Norvasc 10 mg oral tablet: 1 tab(s) orally once a day  oxyCODONE 5 mg oral tablet: 1 tab(s) orally every 6 hours MDD: 8  ProAir HFA 90 mcg/inh inhalation aerosol: 2 puff(s) inhaled 4 times a day, As Needed   amoxicillin-clavulanate 875 mg-125 mg oral tablet: 1 tab(s) orally 2 times a day  glimepiride 2 mg oral tablet: 1 tab(s) orally once a day  glimepiride 4 mg oral tablet: 1 tab(s) orally 2 times a day  hydrochlorothiazide 12.5 mg oral tablet: 1 tab(s) orally once a day  hydrochlorothiazide-triamterene 25 mg-37.5 mg oral tablet: 1 tab(s) orally once a day  insulin: 15 unit(s) subcutaneous   Levemir FlexTouch 100 units/mL subcutaneous solution: 15 unit(s) subcutaneous once a day (at bedtime)  losartan 50 mg oral tablet: 1 tab(s) orally once a day  Norvasc 10 mg oral tablet: 1 tab(s) orally once a day  ProAir HFA 90 mcg/inh inhalation aerosol: 2 puff(s) inhaled 4 times a day, As Needed  Tylenol 325 mg oral tablet: 3 tab(s) orally every 6 hours as needed for pain. do not take more than 4 doses a day

## 2023-06-17 NOTE — DISCHARGE NOTE PROVIDER - NSDCCPCAREPLAN_GEN_ALL_CORE_FT
PRINCIPAL DISCHARGE DIAGNOSIS  Diagnosis: Acute cholecystitis  Assessment and Plan of Treatment:       SECONDARY DISCHARGE DIAGNOSES  Diagnosis: DM (diabetes mellitus)  Assessment and Plan of Treatment:     Diagnosis: HTN (hypertension)  Assessment and Plan of Treatment:     Diagnosis: COPD with asthma  Assessment and Plan of Treatment:

## 2023-06-18 LAB
ANION GAP SERPL CALC-SCNC: 2 MMOL/L — LOW (ref 5–17)
BUN SERPL-MCNC: 25 MG/DL — HIGH (ref 7–23)
CALCIUM SERPL-MCNC: 10.3 MG/DL — HIGH (ref 8.5–10.1)
CHLORIDE SERPL-SCNC: 108 MMOL/L — SIGNIFICANT CHANGE UP (ref 96–108)
CO2 SERPL-SCNC: 30 MMOL/L — SIGNIFICANT CHANGE UP (ref 22–31)
CREAT SERPL-MCNC: 1.56 MG/DL — HIGH (ref 0.5–1.3)
EGFR: 36 ML/MIN/1.73M2 — LOW
GLUCOSE BLDC GLUCOMTR-MCNC: 134 MG/DL — HIGH (ref 70–99)
GLUCOSE BLDC GLUCOMTR-MCNC: 145 MG/DL — HIGH (ref 70–99)
GLUCOSE BLDC GLUCOMTR-MCNC: 152 MG/DL — HIGH (ref 70–99)
GLUCOSE BLDC GLUCOMTR-MCNC: 197 MG/DL — HIGH (ref 70–99)
GLUCOSE BLDC GLUCOMTR-MCNC: 308 MG/DL — HIGH (ref 70–99)
GLUCOSE BLDC GLUCOMTR-MCNC: 89 MG/DL — SIGNIFICANT CHANGE UP (ref 70–99)
GLUCOSE SERPL-MCNC: 109 MG/DL — HIGH (ref 70–99)
HCT VFR BLD CALC: 27.7 % — LOW (ref 34.5–45)
HGB BLD-MCNC: 9 G/DL — LOW (ref 11.5–15.5)
MAGNESIUM SERPL-MCNC: 2.1 MG/DL — SIGNIFICANT CHANGE UP (ref 1.6–2.6)
MCHC RBC-ENTMCNC: 25.8 PG — LOW (ref 27–34)
MCHC RBC-ENTMCNC: 32.5 G/DL — SIGNIFICANT CHANGE UP (ref 32–36)
MCV RBC AUTO: 79.4 FL — LOW (ref 80–100)
NRBC # BLD: 0 /100 WBCS — SIGNIFICANT CHANGE UP (ref 0–0)
PHOSPHATE SERPL-MCNC: 2.3 MG/DL — LOW (ref 2.5–4.5)
PLATELET # BLD AUTO: 361 K/UL — SIGNIFICANT CHANGE UP (ref 150–400)
POTASSIUM SERPL-MCNC: 4.3 MMOL/L — SIGNIFICANT CHANGE UP (ref 3.5–5.3)
POTASSIUM SERPL-SCNC: 4.3 MMOL/L — SIGNIFICANT CHANGE UP (ref 3.5–5.3)
RBC # BLD: 3.49 M/UL — LOW (ref 3.8–5.2)
RBC # FLD: 12.2 % — SIGNIFICANT CHANGE UP (ref 10.3–14.5)
SODIUM SERPL-SCNC: 140 MMOL/L — SIGNIFICANT CHANGE UP (ref 135–145)
WBC # BLD: 12.9 K/UL — HIGH (ref 3.8–10.5)
WBC # FLD AUTO: 12.9 K/UL — HIGH (ref 3.8–10.5)

## 2023-06-18 PROCEDURE — 99232 SBSQ HOSP IP/OBS MODERATE 35: CPT

## 2023-06-18 RX ORDER — ACETAMINOPHEN 500 MG
975 TABLET ORAL EVERY 6 HOURS
Refills: 0 | Status: DISCONTINUED | OUTPATIENT
Start: 2023-06-18 | End: 2023-06-19

## 2023-06-18 RX ORDER — SODIUM,POTASSIUM PHOSPHATES 278-250MG
1 POWDER IN PACKET (EA) ORAL ONCE
Refills: 0 | Status: DISCONTINUED | OUTPATIENT
Start: 2023-06-18 | End: 2023-06-23

## 2023-06-18 RX ADMIN — Medication 1 TABLET(S): at 06:58

## 2023-06-18 RX ADMIN — AMLODIPINE BESYLATE 10 MILLIGRAM(S): 2.5 TABLET ORAL at 06:58

## 2023-06-18 RX ADMIN — Medication 975 MILLIGRAM(S): at 16:50

## 2023-06-18 RX ADMIN — Medication 6 UNIT(S): at 11:53

## 2023-06-18 RX ADMIN — Medication 1: at 11:54

## 2023-06-18 RX ADMIN — Medication 975 MILLIGRAM(S): at 11:53

## 2023-06-18 RX ADMIN — MORPHINE SULFATE 2 MILLIGRAM(S): 50 CAPSULE, EXTENDED RELEASE ORAL at 06:55

## 2023-06-18 RX ADMIN — PIPERACILLIN AND TAZOBACTAM 25 GRAM(S): 4; .5 INJECTION, POWDER, LYOPHILIZED, FOR SOLUTION INTRAVENOUS at 22:09

## 2023-06-18 RX ADMIN — Medication 4: at 00:49

## 2023-06-18 RX ADMIN — HEPARIN SODIUM 5000 UNIT(S): 5000 INJECTION INTRAVENOUS; SUBCUTANEOUS at 14:47

## 2023-06-18 RX ADMIN — INSULIN GLARGINE 20 UNIT(S): 100 INJECTION, SOLUTION SUBCUTANEOUS at 22:08

## 2023-06-18 RX ADMIN — Medication 6 UNIT(S): at 06:55

## 2023-06-18 RX ADMIN — Medication 6 UNIT(S): at 16:50

## 2023-06-18 RX ADMIN — LOSARTAN POTASSIUM 50 MILLIGRAM(S): 100 TABLET, FILM COATED ORAL at 06:58

## 2023-06-18 RX ADMIN — HEPARIN SODIUM 5000 UNIT(S): 5000 INJECTION INTRAVENOUS; SUBCUTANEOUS at 06:58

## 2023-06-18 RX ADMIN — PIPERACILLIN AND TAZOBACTAM 25 GRAM(S): 4; .5 INJECTION, POWDER, LYOPHILIZED, FOR SOLUTION INTRAVENOUS at 06:57

## 2023-06-18 RX ADMIN — PIPERACILLIN AND TAZOBACTAM 25 GRAM(S): 4; .5 INJECTION, POWDER, LYOPHILIZED, FOR SOLUTION INTRAVENOUS at 14:46

## 2023-06-18 RX ADMIN — HEPARIN SODIUM 5000 UNIT(S): 5000 INJECTION INTRAVENOUS; SUBCUTANEOUS at 22:09

## 2023-06-18 NOTE — PHYSICAL THERAPY INITIAL EVALUATION ADULT - NSPTDISCHREC_GEN_A_CORE
for follow up building and improve strength, general endurance, standing and ambulation balance, prevent falls/Home PT

## 2023-06-18 NOTE — PHYSICAL THERAPY INITIAL EVALUATION ADULT - BED MOBILITY TRAINING, PT EVAL
Improve bed mobility- supine<>sit, rolling side<>side to independent observing proper body mechanics, proper positioning, body alignment and precautions.

## 2023-06-18 NOTE — PHYSICAL THERAPY INITIAL EVALUATION ADULT - TRANSFER TRAINING, PT EVAL
Improve transfer ability bed to chair and vice versa to independent using appropriate assistive device if needed  and prevent falls.

## 2023-06-18 NOTE — PHYSICAL THERAPY INITIAL EVALUATION ADULT - ADDITIONAL COMMENTS
Pt states prior to this hospitalization, she is independent in ADLs, does not use any walking device, has a job

## 2023-06-18 NOTE — PHYSICAL THERAPY INITIAL EVALUATION ADULT - GAIT TRAINING, PT EVAL
Improve ambulation ability up to 200 feet or more with or without assistive device and eventually a safe community ambulator.

## 2023-06-18 NOTE — PHYSICAL THERAPY INITIAL EVALUATION ADULT - STRENGTHENING, PT EVAL
Improve strength in the lower extremities to 5/5 and general endurance to good  and be able to perform functional tasks-bed mobility, sitting, standing, transfers and ambulate in a safe manner with or without  assistive device and prevent falls.

## 2023-06-19 ENCOUNTER — TRANSCRIPTION ENCOUNTER (OUTPATIENT)
Age: 68
End: 2023-06-19

## 2023-06-19 LAB
ANION GAP SERPL CALC-SCNC: 2 MMOL/L — LOW (ref 5–17)
BUN SERPL-MCNC: 18 MG/DL — SIGNIFICANT CHANGE UP (ref 7–23)
CALCIUM SERPL-MCNC: 9.6 MG/DL — SIGNIFICANT CHANGE UP (ref 8.5–10.1)
CHLORIDE SERPL-SCNC: 107 MMOL/L — SIGNIFICANT CHANGE UP (ref 96–108)
CO2 SERPL-SCNC: 29 MMOL/L — SIGNIFICANT CHANGE UP (ref 22–31)
CREAT SERPL-MCNC: 1.25 MG/DL — SIGNIFICANT CHANGE UP (ref 0.5–1.3)
EGFR: 46 ML/MIN/1.73M2 — LOW
GLUCOSE BLDC GLUCOMTR-MCNC: 122 MG/DL — HIGH (ref 70–99)
GLUCOSE BLDC GLUCOMTR-MCNC: 226 MG/DL — HIGH (ref 70–99)
GLUCOSE BLDC GLUCOMTR-MCNC: 229 MG/DL — HIGH (ref 70–99)
GLUCOSE BLDC GLUCOMTR-MCNC: 242 MG/DL — HIGH (ref 70–99)
GLUCOSE BLDC GLUCOMTR-MCNC: 351 MG/DL — HIGH (ref 70–99)
GLUCOSE SERPL-MCNC: 134 MG/DL — HIGH (ref 70–99)
HCT VFR BLD CALC: 27.2 % — LOW (ref 34.5–45)
HGB BLD-MCNC: 8.8 G/DL — LOW (ref 11.5–15.5)
MAGNESIUM SERPL-MCNC: 1.9 MG/DL — SIGNIFICANT CHANGE UP (ref 1.6–2.6)
MCHC RBC-ENTMCNC: 25.5 PG — LOW (ref 27–34)
MCHC RBC-ENTMCNC: 32.4 G/DL — SIGNIFICANT CHANGE UP (ref 32–36)
MCV RBC AUTO: 78.8 FL — LOW (ref 80–100)
NRBC # BLD: 0 /100 WBCS — SIGNIFICANT CHANGE UP (ref 0–0)
PHOSPHATE SERPL-MCNC: 3.9 MG/DL — SIGNIFICANT CHANGE UP (ref 2.5–4.5)
PLATELET # BLD AUTO: 379 K/UL — SIGNIFICANT CHANGE UP (ref 150–400)
POTASSIUM SERPL-MCNC: 3.7 MMOL/L — SIGNIFICANT CHANGE UP (ref 3.5–5.3)
POTASSIUM SERPL-SCNC: 3.7 MMOL/L — SIGNIFICANT CHANGE UP (ref 3.5–5.3)
RBC # BLD: 3.45 M/UL — LOW (ref 3.8–5.2)
RBC # FLD: 12.4 % — SIGNIFICANT CHANGE UP (ref 10.3–14.5)
SODIUM SERPL-SCNC: 138 MMOL/L — SIGNIFICANT CHANGE UP (ref 135–145)
WBC # BLD: 10.75 K/UL — HIGH (ref 3.8–10.5)
WBC # FLD AUTO: 10.75 K/UL — HIGH (ref 3.8–10.5)

## 2023-06-19 PROCEDURE — 93010 ELECTROCARDIOGRAM REPORT: CPT

## 2023-06-19 RX ORDER — MORPHINE SULFATE 50 MG/1
2 CAPSULE, EXTENDED RELEASE ORAL EVERY 6 HOURS
Refills: 0 | Status: DISCONTINUED | OUTPATIENT
Start: 2023-06-19 | End: 2023-06-22

## 2023-06-19 RX ORDER — ACETAMINOPHEN 500 MG
1000 TABLET ORAL ONCE
Refills: 0 | Status: COMPLETED | OUTPATIENT
Start: 2023-06-19 | End: 2023-06-19

## 2023-06-19 RX ORDER — KETOROLAC TROMETHAMINE 30 MG/ML
15 SYRINGE (ML) INJECTION ONCE
Refills: 0 | Status: DISCONTINUED | OUTPATIENT
Start: 2023-06-19 | End: 2023-06-19

## 2023-06-19 RX ORDER — LACTULOSE 10 G/15ML
30 SOLUTION ORAL ONCE
Refills: 0 | Status: COMPLETED | OUTPATIENT
Start: 2023-06-19 | End: 2023-06-19

## 2023-06-19 RX ADMIN — Medication 15 MILLIGRAM(S): at 10:42

## 2023-06-19 RX ADMIN — Medication 2: at 17:04

## 2023-06-19 RX ADMIN — PIPERACILLIN AND TAZOBACTAM 25 GRAM(S): 4; .5 INJECTION, POWDER, LYOPHILIZED, FOR SOLUTION INTRAVENOUS at 06:27

## 2023-06-19 RX ADMIN — Medication 1 TABLET(S): at 06:28

## 2023-06-19 RX ADMIN — LOSARTAN POTASSIUM 50 MILLIGRAM(S): 100 TABLET, FILM COATED ORAL at 06:27

## 2023-06-19 RX ADMIN — Medication 975 MILLIGRAM(S): at 06:33

## 2023-06-19 RX ADMIN — Medication 400 MILLIGRAM(S): at 12:01

## 2023-06-19 RX ADMIN — Medication 2: at 06:55

## 2023-06-19 RX ADMIN — Medication 2: at 00:18

## 2023-06-19 RX ADMIN — HEPARIN SODIUM 5000 UNIT(S): 5000 INJECTION INTRAVENOUS; SUBCUTANEOUS at 14:10

## 2023-06-19 RX ADMIN — PIPERACILLIN AND TAZOBACTAM 25 GRAM(S): 4; .5 INJECTION, POWDER, LYOPHILIZED, FOR SOLUTION INTRAVENOUS at 21:33

## 2023-06-19 RX ADMIN — Medication 2: at 12:01

## 2023-06-19 RX ADMIN — PIPERACILLIN AND TAZOBACTAM 25 GRAM(S): 4; .5 INJECTION, POWDER, LYOPHILIZED, FOR SOLUTION INTRAVENOUS at 14:10

## 2023-06-19 RX ADMIN — HEPARIN SODIUM 5000 UNIT(S): 5000 INJECTION INTRAVENOUS; SUBCUTANEOUS at 21:33

## 2023-06-19 RX ADMIN — Medication 1000 MILLIGRAM(S): at 21:30

## 2023-06-19 RX ADMIN — Medication 400 MILLIGRAM(S): at 21:09

## 2023-06-19 RX ADMIN — Medication 975 MILLIGRAM(S): at 00:18

## 2023-06-19 RX ADMIN — Medication 6 UNIT(S): at 12:00

## 2023-06-19 RX ADMIN — Medication 6 UNIT(S): at 08:24

## 2023-06-19 RX ADMIN — LACTULOSE 30 GRAM(S): 10 SOLUTION ORAL at 12:01

## 2023-06-19 RX ADMIN — Medication 6 UNIT(S): at 17:04

## 2023-06-19 RX ADMIN — AMLODIPINE BESYLATE 10 MILLIGRAM(S): 2.5 TABLET ORAL at 06:28

## 2023-06-19 RX ADMIN — INSULIN GLARGINE 20 UNIT(S): 100 INJECTION, SOLUTION SUBCUTANEOUS at 21:49

## 2023-06-19 RX ADMIN — Medication 15 MILLIGRAM(S): at 10:58

## 2023-06-19 RX ADMIN — Medication 1000 MILLIGRAM(S): at 12:16

## 2023-06-19 RX ADMIN — ALBUTEROL 2 PUFF(S): 90 AEROSOL, METERED ORAL at 09:16

## 2023-06-19 RX ADMIN — HEPARIN SODIUM 5000 UNIT(S): 5000 INJECTION INTRAVENOUS; SUBCUTANEOUS at 06:28

## 2023-06-19 RX ADMIN — MORPHINE SULFATE 2 MILLIGRAM(S): 50 CAPSULE, EXTENDED RELEASE ORAL at 01:44

## 2023-06-19 NOTE — DISCHARGE NOTE NURSING/CASE MANAGEMENT/SOCIAL WORK - PATIENT PORTAL LINK FT
You can access the FollowMyHealth Patient Portal offered by [facility] by registering at the following website: [email or web address]. By joining [facility]’s FollowMyHealth portal, you will also be able to view your health information using other applications (apps) compatible with our system.

## 2023-06-19 NOTE — DISCHARGE NOTE NURSING/CASE MANAGEMENT/SOCIAL WORK - NSDCPEFALRISK_GEN_ALL_CORE
For information on Fall & Injury Prevention, visit: [email or web address] OR  [email or web address] OR  https://www.cdc.gov/steadi/patient.html

## 2023-06-19 NOTE — PHARMACOTHERAPY INTERVENTION NOTE - COMMENTS
Recommended to discontinue Zosyn 3.375 g IV every 8 hours at the end of 6/19 to complete a 4 day treatment duration for an IAI after source control with surgery.

## 2023-06-19 NOTE — PROGRESS NOTE ADULT - NS ATTEND AMEND GEN_ALL_CORE FT
Improving pain in RUQ. No nausea or vomiting. Tolerating diet, has not had a bowel movement since before surgery. Afebrile.     Incisions CDI.   Non-distended.   Mild TTP in RUQ.     Labs reviewed.     67yo woman POD 4 lap shawn.   - Stool softeners, bowel regimen.   - Appreciate PT.   - DC planning for home.

## 2023-06-20 LAB
ALBUMIN SERPL ELPH-MCNC: 2.3 G/DL — LOW (ref 3.3–5)
ALP SERPL-CCNC: 149 U/L — HIGH (ref 40–120)
ALT FLD-CCNC: 119 U/L — HIGH (ref 12–78)
ANION GAP SERPL CALC-SCNC: 1 MMOL/L — LOW (ref 5–17)
AST SERPL-CCNC: 70 U/L — HIGH (ref 15–37)
BILIRUB DIRECT SERPL-MCNC: 0.2 MG/DL — SIGNIFICANT CHANGE UP (ref 0–0.3)
BILIRUB INDIRECT FLD-MCNC: 0.2 MG/DL — SIGNIFICANT CHANGE UP (ref 0.2–1)
BILIRUB SERPL-MCNC: 0.4 MG/DL — SIGNIFICANT CHANGE UP (ref 0.2–1.2)
BUN SERPL-MCNC: 19 MG/DL — SIGNIFICANT CHANGE UP (ref 7–23)
CALCIUM SERPL-MCNC: 9.7 MG/DL — SIGNIFICANT CHANGE UP (ref 8.5–10.1)
CHLORIDE SERPL-SCNC: 105 MMOL/L — SIGNIFICANT CHANGE UP (ref 96–108)
CO2 SERPL-SCNC: 28 MMOL/L — SIGNIFICANT CHANGE UP (ref 22–31)
CREAT SERPL-MCNC: 1.29 MG/DL — SIGNIFICANT CHANGE UP (ref 0.5–1.3)
EGFR: 45 ML/MIN/1.73M2 — LOW
GLUCOSE BLDC GLUCOMTR-MCNC: 144 MG/DL — HIGH (ref 70–99)
GLUCOSE BLDC GLUCOMTR-MCNC: 172 MG/DL — HIGH (ref 70–99)
GLUCOSE BLDC GLUCOMTR-MCNC: 196 MG/DL — HIGH (ref 70–99)
GLUCOSE BLDC GLUCOMTR-MCNC: 196 MG/DL — HIGH (ref 70–99)
GLUCOSE BLDC GLUCOMTR-MCNC: 297 MG/DL — HIGH (ref 70–99)
GLUCOSE SERPL-MCNC: 208 MG/DL — HIGH (ref 70–99)
HCT VFR BLD CALC: 28 % — LOW (ref 34.5–45)
HGB BLD-MCNC: 9.1 G/DL — LOW (ref 11.5–15.5)
MCHC RBC-ENTMCNC: 25.9 PG — LOW (ref 27–34)
MCHC RBC-ENTMCNC: 32.5 G/DL — SIGNIFICANT CHANGE UP (ref 32–36)
MCV RBC AUTO: 79.8 FL — LOW (ref 80–100)
NRBC # BLD: 0 /100 WBCS — SIGNIFICANT CHANGE UP (ref 0–0)
PLATELET # BLD AUTO: 450 K/UL — HIGH (ref 150–400)
POTASSIUM SERPL-MCNC: 4.4 MMOL/L — SIGNIFICANT CHANGE UP (ref 3.5–5.3)
POTASSIUM SERPL-SCNC: 4.4 MMOL/L — SIGNIFICANT CHANGE UP (ref 3.5–5.3)
PROT SERPL-MCNC: 7.1 GM/DL — SIGNIFICANT CHANGE UP (ref 6–8.3)
RBC # BLD: 3.51 M/UL — LOW (ref 3.8–5.2)
RBC # FLD: 12.8 % — SIGNIFICANT CHANGE UP (ref 10.3–14.5)
SODIUM SERPL-SCNC: 134 MMOL/L — LOW (ref 135–145)
SURGICAL PATHOLOGY STUDY: SIGNIFICANT CHANGE UP
WBC # BLD: 13.4 K/UL — HIGH (ref 3.8–10.5)
WBC # FLD AUTO: 13.4 K/UL — HIGH (ref 3.8–10.5)

## 2023-06-20 PROCEDURE — 99232 SBSQ HOSP IP/OBS MODERATE 35: CPT

## 2023-06-20 PROCEDURE — 76705 ECHO EXAM OF ABDOMEN: CPT | Mod: 26

## 2023-06-20 RX ORDER — ACETAMINOPHEN 500 MG
975 TABLET ORAL EVERY 6 HOURS
Refills: 0 | Status: DISCONTINUED | OUTPATIENT
Start: 2023-06-20 | End: 2023-06-22

## 2023-06-20 RX ORDER — PIPERACILLIN AND TAZOBACTAM 4; .5 G/20ML; G/20ML
3.38 INJECTION, POWDER, LYOPHILIZED, FOR SOLUTION INTRAVENOUS EVERY 8 HOURS
Refills: 0 | Status: DISCONTINUED | OUTPATIENT
Start: 2023-06-20 | End: 2023-06-23

## 2023-06-20 RX ORDER — KETOROLAC TROMETHAMINE 30 MG/ML
15 SYRINGE (ML) INJECTION ONCE
Refills: 0 | Status: DISCONTINUED | OUTPATIENT
Start: 2023-06-20 | End: 2023-06-20

## 2023-06-20 RX ORDER — ACETAMINOPHEN 500 MG
1000 TABLET ORAL ONCE
Refills: 0 | Status: COMPLETED | OUTPATIENT
Start: 2023-06-20 | End: 2023-06-20

## 2023-06-20 RX ORDER — SODIUM CHLORIDE 9 MG/ML
1000 INJECTION, SOLUTION INTRAVENOUS
Refills: 0 | Status: DISCONTINUED | OUTPATIENT
Start: 2023-06-20 | End: 2023-06-23

## 2023-06-20 RX ADMIN — Medication 15 MILLIGRAM(S): at 17:15

## 2023-06-20 RX ADMIN — Medication 975 MILLIGRAM(S): at 13:40

## 2023-06-20 RX ADMIN — Medication 6 UNIT(S): at 16:47

## 2023-06-20 RX ADMIN — Medication 3: at 00:06

## 2023-06-20 RX ADMIN — Medication 1: at 16:47

## 2023-06-20 RX ADMIN — Medication 400 MILLIGRAM(S): at 06:04

## 2023-06-20 RX ADMIN — Medication 975 MILLIGRAM(S): at 12:47

## 2023-06-20 RX ADMIN — AMLODIPINE BESYLATE 10 MILLIGRAM(S): 2.5 TABLET ORAL at 05:29

## 2023-06-20 RX ADMIN — Medication 6 UNIT(S): at 11:53

## 2023-06-20 RX ADMIN — Medication 400 MILLIGRAM(S): at 19:56

## 2023-06-20 RX ADMIN — HEPARIN SODIUM 5000 UNIT(S): 5000 INJECTION INTRAVENOUS; SUBCUTANEOUS at 05:32

## 2023-06-20 RX ADMIN — Medication 1000 MILLIGRAM(S): at 06:25

## 2023-06-20 RX ADMIN — Medication 6 UNIT(S): at 08:36

## 2023-06-20 RX ADMIN — SODIUM CHLORIDE 120 MILLILITER(S): 9 INJECTION, SOLUTION INTRAVENOUS at 23:30

## 2023-06-20 RX ADMIN — Medication 1: at 11:53

## 2023-06-20 RX ADMIN — INSULIN GLARGINE 20 UNIT(S): 100 INJECTION, SOLUTION SUBCUTANEOUS at 21:39

## 2023-06-20 RX ADMIN — Medication 1 TABLET(S): at 05:30

## 2023-06-20 RX ADMIN — PIPERACILLIN AND TAZOBACTAM 25 GRAM(S): 4; .5 INJECTION, POWDER, LYOPHILIZED, FOR SOLUTION INTRAVENOUS at 21:33

## 2023-06-20 RX ADMIN — Medication 15 MILLIGRAM(S): at 17:30

## 2023-06-20 RX ADMIN — LOSARTAN POTASSIUM 50 MILLIGRAM(S): 100 TABLET, FILM COATED ORAL at 05:29

## 2023-06-21 LAB
ALBUMIN SERPL ELPH-MCNC: 2.3 G/DL — LOW (ref 3.3–5)
ALP SERPL-CCNC: 158 U/L — HIGH (ref 40–120)
ALT FLD-CCNC: 202 U/L — HIGH (ref 12–78)
ANION GAP SERPL CALC-SCNC: 2 MMOL/L — LOW (ref 5–17)
APTT BLD: 30.3 SEC — SIGNIFICANT CHANGE UP (ref 27.5–35.5)
AST SERPL-CCNC: 163 U/L — HIGH (ref 15–37)
BILIRUB DIRECT SERPL-MCNC: 0.1 MG/DL — SIGNIFICANT CHANGE UP (ref 0–0.3)
BILIRUB INDIRECT FLD-MCNC: 0.5 MG/DL — SIGNIFICANT CHANGE UP (ref 0.2–1)
BILIRUB SERPL-MCNC: 0.6 MG/DL — SIGNIFICANT CHANGE UP (ref 0.2–1.2)
BLD GP AB SCN SERPL QL: SIGNIFICANT CHANGE UP
BUN SERPL-MCNC: 21 MG/DL — SIGNIFICANT CHANGE UP (ref 7–23)
CALCIUM SERPL-MCNC: 10.3 MG/DL — HIGH (ref 8.5–10.1)
CHLORIDE SERPL-SCNC: 109 MMOL/L — HIGH (ref 96–108)
CO2 SERPL-SCNC: 24 MMOL/L — SIGNIFICANT CHANGE UP (ref 22–31)
CREAT SERPL-MCNC: 1.38 MG/DL — HIGH (ref 0.5–1.3)
EGFR: 41 ML/MIN/1.73M2 — LOW
GLUCOSE BLDC GLUCOMTR-MCNC: 164 MG/DL — HIGH (ref 70–99)
GLUCOSE BLDC GLUCOMTR-MCNC: 172 MG/DL — HIGH (ref 70–99)
GLUCOSE BLDC GLUCOMTR-MCNC: 210 MG/DL — HIGH (ref 70–99)
GLUCOSE BLDC GLUCOMTR-MCNC: 218 MG/DL — HIGH (ref 70–99)
GLUCOSE SERPL-MCNC: 241 MG/DL — HIGH (ref 70–99)
GRAM STN FLD: SIGNIFICANT CHANGE UP
HCT VFR BLD CALC: 27.4 % — LOW (ref 34.5–45)
HGB BLD-MCNC: 8.5 G/DL — LOW (ref 11.5–15.5)
INR BLD: 1.01 RATIO — SIGNIFICANT CHANGE UP (ref 0.88–1.16)
LIDOCAIN IGE QN: 29 U/L — LOW (ref 73–393)
MAGNESIUM SERPL-MCNC: 1.9 MG/DL — SIGNIFICANT CHANGE UP (ref 1.6–2.6)
MCHC RBC-ENTMCNC: 25.4 PG — LOW (ref 27–34)
MCHC RBC-ENTMCNC: 31 G/DL — LOW (ref 32–36)
MCV RBC AUTO: 82 FL — SIGNIFICANT CHANGE UP (ref 80–100)
NRBC # BLD: 0 /100 WBCS — SIGNIFICANT CHANGE UP (ref 0–0)
PHOSPHATE SERPL-MCNC: 2.2 MG/DL — LOW (ref 2.5–4.5)
PLATELET # BLD AUTO: 449 K/UL — HIGH (ref 150–400)
POTASSIUM SERPL-MCNC: 4.6 MMOL/L — SIGNIFICANT CHANGE UP (ref 3.5–5.3)
POTASSIUM SERPL-SCNC: 4.6 MMOL/L — SIGNIFICANT CHANGE UP (ref 3.5–5.3)
PROT SERPL-MCNC: 7.2 GM/DL — SIGNIFICANT CHANGE UP (ref 6–8.3)
PROTHROM AB SERPL-ACNC: 12.1 SEC — SIGNIFICANT CHANGE UP (ref 10.5–13.4)
RBC # BLD: 3.34 M/UL — LOW (ref 3.8–5.2)
RBC # FLD: 12.7 % — SIGNIFICANT CHANGE UP (ref 10.3–14.5)
SODIUM SERPL-SCNC: 135 MMOL/L — SIGNIFICANT CHANGE UP (ref 135–145)
SPECIMEN SOURCE: SIGNIFICANT CHANGE UP
WBC # BLD: 13.3 K/UL — HIGH (ref 3.8–10.5)
WBC # FLD AUTO: 13.3 K/UL — HIGH (ref 3.8–10.5)

## 2023-06-21 PROCEDURE — 99233 SBSQ HOSP IP/OBS HIGH 50: CPT

## 2023-06-21 PROCEDURE — 74177 CT ABD & PELVIS W/CONTRAST: CPT | Mod: 26

## 2023-06-21 PROCEDURE — 49406 IMAGE CATH FLUID PERI/RETRO: CPT

## 2023-06-21 RX ORDER — SODIUM CHLORIDE 9 MG/ML
1000 INJECTION, SOLUTION INTRAVENOUS ONCE
Refills: 0 | Status: COMPLETED | OUTPATIENT
Start: 2023-06-21 | End: 2023-06-21

## 2023-06-21 RX ORDER — ACETAMINOPHEN 500 MG
1000 TABLET ORAL ONCE
Refills: 0 | Status: COMPLETED | OUTPATIENT
Start: 2023-06-21 | End: 2023-06-21

## 2023-06-21 RX ORDER — ACETAMINOPHEN 500 MG
1000 TABLET ORAL ONCE
Refills: 0 | Status: COMPLETED | OUTPATIENT
Start: 2023-06-21 | End: 2023-06-22

## 2023-06-21 RX ADMIN — SODIUM CHLORIDE 120 MILLILITER(S): 9 INJECTION, SOLUTION INTRAVENOUS at 21:48

## 2023-06-21 RX ADMIN — PIPERACILLIN AND TAZOBACTAM 25 GRAM(S): 4; .5 INJECTION, POWDER, LYOPHILIZED, FOR SOLUTION INTRAVENOUS at 17:00

## 2023-06-21 RX ADMIN — SODIUM CHLORIDE 1000 MILLILITER(S): 9 INJECTION, SOLUTION INTRAVENOUS at 12:40

## 2023-06-21 RX ADMIN — Medication 2: at 06:50

## 2023-06-21 RX ADMIN — Medication 400 MILLIGRAM(S): at 20:27

## 2023-06-21 RX ADMIN — PIPERACILLIN AND TAZOBACTAM 25 GRAM(S): 4; .5 INJECTION, POWDER, LYOPHILIZED, FOR SOLUTION INTRAVENOUS at 21:46

## 2023-06-21 RX ADMIN — INSULIN GLARGINE 20 UNIT(S): 100 INJECTION, SOLUTION SUBCUTANEOUS at 21:53

## 2023-06-21 RX ADMIN — Medication 1: at 00:35

## 2023-06-21 RX ADMIN — LOSARTAN POTASSIUM 50 MILLIGRAM(S): 100 TABLET, FILM COATED ORAL at 05:36

## 2023-06-21 RX ADMIN — AMLODIPINE BESYLATE 10 MILLIGRAM(S): 2.5 TABLET ORAL at 05:36

## 2023-06-21 RX ADMIN — PIPERACILLIN AND TAZOBACTAM 25 GRAM(S): 4; .5 INJECTION, POWDER, LYOPHILIZED, FOR SOLUTION INTRAVENOUS at 05:36

## 2023-06-21 RX ADMIN — Medication 62.5 MILLIMOLE(S): at 21:46

## 2023-06-21 RX ADMIN — Medication 1: at 17:51

## 2023-06-21 RX ADMIN — Medication 1 TABLET(S): at 05:36

## 2023-06-21 NOTE — DIETITIAN INITIAL EVALUATION ADULT - OTHER INFO
Pt with PMHx of HTN, T2DM, asthma, hx of  x 3. Pt presented with abdominal pain; admitted with acute cholecystitis. s/p lap shawn 06/15. US on ; with post-op fluid and blood products vs a small biloma; for procedure today. Pt also with CKD 3.  Pt reports good appetite and good PO intake until x 1 day PTA when abdominal pain began. Appetite improving to normal; now consuming mostly % of meals. Denies any nausea or vomiting; reports constipation, s/p BM yesterday. Denies any chew/swallowing difficulty. No known food allergies.  Pt with T2DM (WgpH8e=1.4%); takes Novolog, Levemir, & glimepiride to control her BG levels at home. Pt tries to follow low CHO diet at home; mostly eats vegetables and protein.  Pt reports ht 5'3", not 5'6", and UBW stable at approximately 170 lbs. Request current wt to determine any changes in wt over past week.

## 2023-06-21 NOTE — CONSULT NOTE ADULT - SUBJECTIVE AND OBJECTIVE BOX
Interventional Radiology    Evaluate for Procedure: biloma drainage    HPI: 68F PMHx HTN, asthma, DM,  x 3, palpitations c/o RUQ pain since yesterday. Admitted with acute cholecystitis, LUDWIG. Now POD#6 s/p lap shawn. US : Approximately 6.2 x 2.4 x 3.8 cm mildly complex collection in the surgical bed. CT scan  reviewed by Dr. Goel. IR consulted for biloma drainage.       Allergies: No Known Allergies    Medications (Abx/Cardiac/Anticoagulation/Blood Products)    amLODIPine   Tablet: 10 milliGRAM(s) Oral ( @ 05:36)  heparin   Injectable: 5000 Unit(s) SubCutaneous ( @ 05:32)  losartan: 50 milliGRAM(s) Oral ( @ 05:36)  piperacillin/tazobactam IVPB..: 25 mL/Hr IV Intermittent ( @ 21:33)  piperacillin/tazobactam IVPB..: 25 mL/Hr IV Intermittent ( @ 05:36)  triamterene 37.5 mG/hydrochlorothiazide 25 mG Tablet: 1 Tablet(s) Oral ( @ 05:36)    Data:    T(C): 36.8  HR: 85  BP: 128/79  RR: 17  SpO2: 95%    -WBC 13.30 / HgB 8.5 / Hct 27.4 / Plt 449  -Na 135 / Cl 109 / BUN 21 / Glucose 241  -K 4.6 / CO2 24 / Cr 1.38  - / Alk Phos 158 / T.Bili 0.6  -INR 1.01 / PTT 30.3    Radiology:   < from: CT Abdomen and Pelvis w/ IV Cont (23 @ 14:21) >  IMPRESSION:  Fine linear defect in the hepatic parenchyma may represent a laceration.    9.8 x 2.7 x 4.4 cm fluid collection in the operative bed may represent a   liquefied hematoma, seroma, abscess or biloma. If a bile leak is of   clinical concern, consider nuclear DISIDA scan for confirmation.    Developing right pleural effusion with underlying passive atelectasis.    Prominent uterus for patient age.    < end of copied text >      Assessment/Plan:   -68F PMHx HTN, asthma, DM,  x 3, palpitations c/o RUQ pain since yesterday. Admitted with acute cholecystitis, LUDWIG. Now POD#6 s/p lap shawn. US : Approximately 6.2 x 2.4 x 3.8 cm mildly complex collection in the surgical bed. CT scan  reviewed by Dr. Goel. IR consulted for biloma drainage.     - case reviewed and approved by Dr. Goel  - Will plan for biloma drainage today   - Discussed extensively with patient and daughter. Patient agreeable to proceed with local anesthesia and conscious sedation.   - d/w primary team Interventional Radiology    Evaluate for Procedure: biloma drainage    HPI: 68F PMHx HTN, asthma, DM,  x 3, palpitations c/o RUQ pain since yesterday. Admitted with acute cholecystitis, LUDWIG. Now POD#6 s/p lap shawn. US : Approximately 6.2 x 2.4 x 3.8 cm mildly complex collection in the surgical bed. CT scan  reviewed by Dr. Goel. IR consulted for biloma drainage.       Allergies: No Known Allergies    Medications (Abx/Cardiac/Anticoagulation/Blood Products)    amLODIPine   Tablet: 10 milliGRAM(s) Oral ( @ 05:36)  heparin   Injectable: 5000 Unit(s) SubCutaneous ( @ 05:32)  losartan: 50 milliGRAM(s) Oral ( @ 05:36)  piperacillin/tazobactam IVPB..: 25 mL/Hr IV Intermittent ( @ 21:33)  piperacillin/tazobactam IVPB..: 25 mL/Hr IV Intermittent ( @ 05:36)  triamterene 37.5 mG/hydrochlorothiazide 25 mG Tablet: 1 Tablet(s) Oral ( @ 05:36)    Data:    T(C): 36.8  HR: 85  BP: 128/79  RR: 17  SpO2: 95%    -WBC 13.30 / HgB 8.5 / Hct 27.4 / Plt 449  -Na 135 / Cl 109 / BUN 21 / Glucose 241  -K 4.6 / CO2 24 / Cr 1.38  - / Alk Phos 158 / T.Bili 0.6  -INR 1.01 / PTT 30.3    Radiology:   < from: CT Abdomen and Pelvis w/ IV Cont (23 @ 14:21) >  IMPRESSION:  Fine linear defect in the hepatic parenchyma may represent a laceration.    9.8 x 2.7 x 4.4 cm fluid collection in the operative bed may represent a   liquefied hematoma, seroma, abscess or biloma. If a bile leak is of   clinical concern, consider nuclear DISIDA scan for confirmation.    Developing right pleural effusion with underlying passive atelectasis.    Prominent uterus for patient age.    < end of copied text >      Assessment/Plan:   -68F PMHx HTN, asthma, DM,  x 3, palpitations c/o RUQ pain since yesterday. Admitted with acute cholecystitis, LUDWIG. Now POD#6 s/p lap shawn. US : Approximately 6.2 x 2.4 x 3.8 cm mildly complex collection in the surgical bed. CT scan  reviewed by Dr. Goel. IR consulted for biloma drainage.     - case reviewed and approved by Dr. Goel  - Will plan for biloma drainage today   - Discussed extensively with patient and daughter. Patient agreeable to proceed with local anesthesia and IV analgesia   - d/w primary team

## 2023-06-21 NOTE — MEDICAL STUDENT PROGRESS NOTE(EDUCATION) - SUBJECTIVE AND OBJECTIVE BOX
S: RUQ US last night showed fluid collection below liver. Pt NPO since midnight yesterday. Last bowel movement was yesterday, pt passing flatus and urinating today. She feels tired today, has not ambulated since yesterday. She also feels worsening RUQ pain radiating to back, being managed with IV ketorolac, morphine PRN, and tylenol.     O:    Vital Signs Last 24 Hrs  T(C): 36.8 (21 Jun 2023 11:13), Max: 36.9 (20 Jun 2023 16:23)  T(F): 98.3 (21 Jun 2023 11:13), Max: 98.4 (20 Jun 2023 16:23)  HR: 85 (21 Jun 2023 11:13) (83 - 85)  BP: 128/79 (21 Jun 2023 11:13) (111/63 - 139/73)  BP(mean): --  RR: 17 (21 Jun 2023 11:13) (16 - 17)  SpO2: 95% (21 Jun 2023 11:13) (94% - 97%)    Parameters below as of 21 Jun 2023 11:13  Patient On (Oxygen Delivery Method): room air    PHYSICAL EXAM:  General: Alert and oriented x 3. Appears uncomfortable.   Pulmonary: Non labored breathing. Lungs clear to auscultation  Abdomen: Bowel sounds present. Abdomen soft and non-distended with some RUQ tenderness. Wounds c/d/i, no erythema present.  : No suprapubic distention. No CVA tenderness or dysuria.  Extremity: No calf tenderness. No lower extremity edema.       LABS:    CBC:                       8.5    13.30 )-----------( 449      ( 21 Jun 2023 10:50 )             27.4     CMP: 06-21    135  |  109<H>  |  21  ----------------------------<  241<H>  4.6   |  24  |  1.38<H>    Ca    10.3<H>      21 Jun 2023 10:50  Phos  2.2     06-21  Mg     1.9     06-21    TPro  7.2  /  Alb  2.3<L>  /  TBili  0.6  /  DBili  0.1  /  AST  163<H>  /  ALT  202<H>  /  AlkPhos  158<H>  06-21    LIVER FUNCTIONS - ( 21 Jun 2023 10:50 )  Alb: 2.3 g/dL / Pro: 7.2 gm/dL / ALK PHOS: 158 U/L / ALT: 202 U/L / AST: 163 U/L / GGT: x           COAGS: PT/INR - ( 21 Jun 2023 10:50 )   PT: 12.1 sec;   INR: 1.01 ratio         PTT - ( 21 Jun 2023 10:50 )  PTT:30.3 sec  UA: Urinalysis Basic - ( 21 Jun 2023 10:50 )    Color: x / Appearance: x / SG: x / pH: x  Gluc: 241 mg/dL / Ketone: x  / Bili: x / Urobili: x   Blood: x / Protein: x / Nitrite: x   Leuk Esterase: x / RBC: x / WBC x   Sq Epi: x / Non Sq Epi: x / Bacteria: x      ABG:   POCT Glucose 172 (16:31), 196 (21:32), 172 (00:23), 210 (05:59)      insulin lispro (ADMELOG) corrective regimen sliding scale: 1 (16:47),  1 (00:35),  2 (06:50)  
S: No overnight events. Pt in pain, from 10 before the surgery to 6 with medication, managed with oxycodone PRN and tylenol. Pain mostly in R upper and lower quadrants, feels it restricts her breathing. Pt passed flatus today, no bowel movements, urinating with purewick. She is tolerating liquids since yesterday, attempting low fat solid meals today. Pt has not ambulated since operation.    O:    Vital Signs Last 24 Hrs  T(C): 36.6 (16 Jun 2023 12:00), Max: 37.1 (15 Thanh 2023 17:31)  T(F): 97.8 (16 Jun 2023 12:00), Max: 98.7 (15 Thanh 2023 17:31)  HR: 84 (16 Jun 2023 12:00) (84 - 102)  BP: 117/70 (16 Jun 2023 12:00) (117/70 - 152/85)  BP(mean): --  RR: 17 (16 Jun 2023 12:00) (16 - 20)  SpO2: 94% (16 Jun 2023 12:00) (94% - 99%)    Parameters below as of 16 Jun 2023 12:00  Patient On (Oxygen Delivery Method): room air      PHYSICAL EXAM:  General: Appears uncomfortable, alert and oriented x 3  Respiratory: Shallow breaths, non-labored. Clear to auscultation  Abdominal: Tenderness in right upper and lower quadrants. Epigastric distention with tympany. Bowel sounds present. No erythema around incision sites.  : No dysuria or suprapubic tenderness  Extremities: No calf tenderness    I&O's Detail    15 Thanh 2023 07:01  -  16 Jun 2023 07:00  --------------------------------------------------------  IN:  Total IN: 0 mL    OUT:    Voided (mL): 600 mL  Total OUT: 600 mL    Total NET: -600 mL      16 Jun 2023 07:01  -  16 Jun 2023 13:13  --------------------------------------------------------  IN:    Oral Fluid: 400 mL  Total IN: 400 mL    OUT:  Total OUT: 0 mL    Total NET: 400 mL        LABS:    CBC:                       9.8    15.10 )-----------( 293      ( 16 Jun 2023 05:19 )             30.0     CMP: 06-16    133<L>  |  98  |  22  ----------------------------<  368<H>  4.1   |  27  |  1.88<H>    Ca    8.9      16 Jun 2023 05:19  Phos  2.3     06-16  Mg     1.7     06-16    TPro  6.6  /  Alb  2.3<L>  /  TBili  0.9  /  DBili  0.3  /  AST  77<H>  /  ALT  101<H>  /  AlkPhos  81  06-16    LIVER FUNCTIONS - ( 16 Jun 2023 05:19 )  Alb: 2.3 g/dL / Pro: 6.6 gm/dL / ALK PHOS: 81 U/L / ALT: 101 U/L / AST: 77 U/L / GGT: x           COAGS:   UA:   ABG:   POCT Glucose 268 (17:36), 417 (00:19), 352 (06:15), 342 (08:08), 406 (11:15)    insulin lispro (ADMELOG) corrective regimen sliding scale: 6 (00:54),  5 (06:00),  6 (11:39)

## 2023-06-21 NOTE — DIETITIAN INITIAL EVALUATION ADULT - PERSON TAUGHT/METHOD
Reviewed consistent carbohydrate, low fat diet, & importance of checking BG levels, taking prescribed medications, adhering to recommended diet, and regularly following up with PCP/Endocrinologist./verbal instruction/written material/patient instructed

## 2023-06-21 NOTE — DIETITIAN INITIAL EVALUATION ADULT - PERTINENT LABORATORY DATA
06-20    134<L>  |  105  |  19  ----------------------------<  208<H>  4.4   |  28  |  1.29    Ca    9.7      20 Jun 2023 14:53    TPro  7.1  /  Alb  2.3<L>  /  TBili  0.4  /  DBili  0.2  /  AST  70<H>  /  ALT  119<H>  /  AlkPhos  149<H>  06-20  POCT Blood Glucose.: 210 mg/dL (06-21-23 @ 05:59)  A1C with Estimated Average Glucose Result: 8.4 % (06-14-23 @ 05:30)

## 2023-06-21 NOTE — PROCEDURE NOTE - PROCEDURE FINDINGS AND DETAILS
Successful placement of 10.2F MPD pigtail catheter for drainage of biloma, using ultrasound and fluoroscopy guidance. Patient tolerated well. No complications. Sterile dressing applied. Attached to gravity bag. Full report to follow.

## 2023-06-21 NOTE — PRE-OP CHECKLIST - SELECT TESTS ORDERED
BMP/CBC/PT/PTT/INR/Hepatic Function/Type and Screen/EKG
BMP/CBC/PT/PTT/INR
BMP/CBC/CMP/PT/PTT/INR/Type and Screen/COVID-19

## 2023-06-21 NOTE — PROGRESS NOTE ADULT - NS PANP COMMENT GEN_ALL_CORE FT
Patient seen and examined  Some epigastric pain radiating to back  No nausea or vomiting    Awake, alert  NO scleral icterus  Abd is soft, not distended, some discomfort in the epigastric area  NO rebound, no guarding    - continue antibiotics  - follow up labs, possible CT abd/pelvis today Patient seen and examined  Some epigastric pain radiating to back  No nausea or vomiting    Awake, alert  NO scleral icterus  Abd is soft, not distended, some discomfort in the epigastric area  NO rebound, no guarding    - continue antibiotics  - follow up labs, CT abd/pelvis

## 2023-06-21 NOTE — MEDICAL STUDENT PROGRESS NOTE(EDUCATION) - ASSESSMENT
CW is a 67yo F who presented with acute cholecystitis now POD1 s/p laparoscopic cholecystectomy, recovering well.
CW is a 67yo F POD6 s/p laparoscopic cholecystectomy complicated with intra-abdominal fluid collection.

## 2023-06-21 NOTE — DIETITIAN INITIAL EVALUATION ADULT - NS FNS DIET ORDER
Diet, NPO after Midnight:      NPO Start Date: 20-Jun-2023,   NPO Start Time: 23:59  Except Medications (06-20-23 @ 19:21)

## 2023-06-21 NOTE — MEDICAL STUDENT PROGRESS NOTE(EDUCATION) - PLAN 1
- Leukocytosis trending down (15.1 < 21.5). Continue abx with pip/tazo  - Advance diet to low fat  - Hypophosphatemia (2.3<2.1) and hypomagnesemia (1.7<1.7) should improve with meals. Continue to monitor with daily CMP. If no change, replete electrolytes  - Continue incentive spirometry
- tBilirubin is normal (0.6), suggesting seroma or abscess over biloma or hematoma.  - WBC continues to be elevated since procedure, trending slightly upward (13.3<13.4<10.75<12.9). Pt is afebrile (98.3<97.9<98.4) and taking tylenol and zosyn.  - Consult with IR for CT and percutaneous drainage  - Continue NPO until after IR consult/drainage.  - Continue empiric abx w/ pip/tazo. Culture drained aspirate.  - Continue incentive spirometry  - Monitor daily LFTs, CMP, and CBC

## 2023-06-21 NOTE — DIETITIAN INITIAL EVALUATION ADULT - PERTINENT MEDS FT
MEDICATIONS  (STANDING):  amLODIPine   Tablet 10 milliGRAM(s) Oral daily  dextrose 5% + sodium chloride 0.45%. 1000 milliLiter(s) (120 mL/Hr) IV Continuous <Continuous>  dextrose 5%. 1000 milliLiter(s) (50 mL/Hr) IV Continuous <Continuous>  dextrose 5%. 1000 milliLiter(s) (100 mL/Hr) IV Continuous <Continuous>  dextrose 50% Injectable 25 Gram(s) IV Push once  dextrose 50% Injectable 12.5 Gram(s) IV Push once  dextrose 50% Injectable 25 Gram(s) IV Push once  glucagon  Injectable 1 milliGRAM(s) IntraMuscular once  insulin glargine Injectable (LANTUS) 20 Unit(s) SubCutaneous at bedtime  insulin lispro (ADMELOG) corrective regimen sliding scale   SubCutaneous every 6 hours  insulin lispro Injectable (ADMELOG) 6 Unit(s) SubCutaneous three times a day before meals  losartan 50 milliGRAM(s) Oral daily  piperacillin/tazobactam IVPB.. 3.375 Gram(s) IV Intermittent every 8 hours  potassium phosphate / sodium phosphate Powder (PHOS-NaK) 1 Packet(s) Oral once  triamterene 37.5 mG/hydrochlorothiazide 25 mG Tablet 1 Tablet(s) Oral daily    MEDICATIONS  (PRN):  acetaminophen     Tablet .. 975 milliGRAM(s) Oral every 6 hours PRN Mild Pain (1 - 3)  albuterol    90 MICROgram(s) HFA Inhaler 2 Puff(s) Inhalation every 6 hours PRN Shortness of Breath and/or Wheezing  dextrose Oral Gel 15 Gram(s) Oral once PRN Blood Glucose LESS THAN 70 milliGRAM(s)/deciliter  morphine  - Injectable 2 milliGRAM(s) IV Push every 6 hours PRN breakthru pain  ondansetron Injectable 4 milliGRAM(s) IV Push every 6 hours PRN Nausea  oxyCODONE    IR 5 milliGRAM(s) Oral every 6 hours PRN Moderate Pain (4 - 6)

## 2023-06-21 NOTE — DIETITIAN INITIAL EVALUATION ADULT - NUTRITIONGOAL OUTCOME1
Improved glycemic control (140-180 mg/dL); Pt to verbalize 3 CHO foods with their recommended portion sizes

## 2023-06-21 NOTE — MEDICAL STUDENT PROGRESS NOTE(EDUCATION) - PLAN 2
- Pause anticoagulation  - Encourage ambulation.   - Continue SCD
- Attempt ambulation with assistance today  - Continue SCD  - Continue heparin subQ 5000U q8hr

## 2023-06-22 ENCOUNTER — APPOINTMENT (OUTPATIENT)
Dept: CT IMAGING | Facility: CLINIC | Age: 68
End: 2023-06-22

## 2023-06-22 LAB
ALBUMIN SERPL ELPH-MCNC: 2.4 G/DL — LOW (ref 3.3–5)
ALP SERPL-CCNC: 182 U/L — HIGH (ref 40–120)
ALT FLD-CCNC: 265 U/L — HIGH (ref 12–78)
ANION GAP SERPL CALC-SCNC: 1 MMOL/L — LOW (ref 5–17)
AST SERPL-CCNC: 175 U/L — HIGH (ref 15–37)
BILIRUB SERPL-MCNC: 0.6 MG/DL — SIGNIFICANT CHANGE UP (ref 0.2–1.2)
BUN SERPL-MCNC: 24 MG/DL — HIGH (ref 7–23)
CALCIUM SERPL-MCNC: 10.2 MG/DL — HIGH (ref 8.5–10.1)
CHLORIDE SERPL-SCNC: 109 MMOL/L — HIGH (ref 96–108)
CO2 SERPL-SCNC: 25 MMOL/L — SIGNIFICANT CHANGE UP (ref 22–31)
CREAT SERPL-MCNC: 1.27 MG/DL — SIGNIFICANT CHANGE UP (ref 0.5–1.3)
EGFR: 45 ML/MIN/1.73M2 — LOW
GLUCOSE BLDC GLUCOMTR-MCNC: 140 MG/DL — HIGH (ref 70–99)
GLUCOSE BLDC GLUCOMTR-MCNC: 182 MG/DL — HIGH (ref 70–99)
GLUCOSE BLDC GLUCOMTR-MCNC: 236 MG/DL — HIGH (ref 70–99)
GLUCOSE BLDC GLUCOMTR-MCNC: 271 MG/DL — HIGH (ref 70–99)
GLUCOSE BLDC GLUCOMTR-MCNC: 356 MG/DL — HIGH (ref 70–99)
GLUCOSE SERPL-MCNC: 141 MG/DL — HIGH (ref 70–99)
HCT VFR BLD CALC: 27.3 % — LOW (ref 34.5–45)
HGB BLD-MCNC: 8.7 G/DL — LOW (ref 11.5–15.5)
MAGNESIUM SERPL-MCNC: 1.9 MG/DL — SIGNIFICANT CHANGE UP (ref 1.6–2.6)
MCHC RBC-ENTMCNC: 26 PG — LOW (ref 27–34)
MCHC RBC-ENTMCNC: 31.9 G/DL — LOW (ref 32–36)
MCV RBC AUTO: 81.7 FL — SIGNIFICANT CHANGE UP (ref 80–100)
NRBC # BLD: 0 /100 WBCS — SIGNIFICANT CHANGE UP (ref 0–0)
PHOSPHATE SERPL-MCNC: 3.6 MG/DL — SIGNIFICANT CHANGE UP (ref 2.5–4.5)
PLATELET # BLD AUTO: 502 K/UL — HIGH (ref 150–400)
POTASSIUM SERPL-MCNC: 4.4 MMOL/L — SIGNIFICANT CHANGE UP (ref 3.5–5.3)
POTASSIUM SERPL-SCNC: 4.4 MMOL/L — SIGNIFICANT CHANGE UP (ref 3.5–5.3)
PROT SERPL-MCNC: 7 GM/DL — SIGNIFICANT CHANGE UP (ref 6–8.3)
RBC # BLD: 3.34 M/UL — LOW (ref 3.8–5.2)
RBC # FLD: 12.8 % — SIGNIFICANT CHANGE UP (ref 10.3–14.5)
SODIUM SERPL-SCNC: 135 MMOL/L — SIGNIFICANT CHANGE UP (ref 135–145)
WBC # BLD: 10.37 K/UL — SIGNIFICANT CHANGE UP (ref 3.8–10.5)
WBC # FLD AUTO: 10.37 K/UL — SIGNIFICANT CHANGE UP (ref 3.8–10.5)

## 2023-06-22 PROCEDURE — 99231 SBSQ HOSP IP/OBS SF/LOW 25: CPT

## 2023-06-22 PROCEDURE — 99233 SBSQ HOSP IP/OBS HIGH 50: CPT

## 2023-06-22 RX ORDER — ACETAMINOPHEN 500 MG
1000 TABLET ORAL ONCE
Refills: 0 | Status: DISCONTINUED | OUTPATIENT
Start: 2023-06-22 | End: 2023-06-23

## 2023-06-22 RX ORDER — ACETAMINOPHEN 500 MG
1000 TABLET ORAL ONCE
Refills: 0 | Status: COMPLETED | OUTPATIENT
Start: 2023-06-22 | End: 2023-06-22

## 2023-06-22 RX ORDER — HEPARIN SODIUM 5000 [USP'U]/ML
5000 INJECTION INTRAVENOUS; SUBCUTANEOUS EVERY 12 HOURS
Refills: 0 | Status: DISCONTINUED | OUTPATIENT
Start: 2023-06-22 | End: 2023-06-23

## 2023-06-22 RX ORDER — IBUPROFEN 200 MG
400 TABLET ORAL EVERY 6 HOURS
Refills: 0 | Status: DISCONTINUED | OUTPATIENT
Start: 2023-06-22 | End: 2023-06-23

## 2023-06-22 RX ADMIN — PIPERACILLIN AND TAZOBACTAM 25 GRAM(S): 4; .5 INJECTION, POWDER, LYOPHILIZED, FOR SOLUTION INTRAVENOUS at 21:37

## 2023-06-22 RX ADMIN — SODIUM CHLORIDE 120 MILLILITER(S): 9 INJECTION, SOLUTION INTRAVENOUS at 23:17

## 2023-06-22 RX ADMIN — Medication 400 MILLIGRAM(S): at 23:16

## 2023-06-22 RX ADMIN — Medication 6 UNIT(S): at 17:09

## 2023-06-22 RX ADMIN — Medication 2: at 00:37

## 2023-06-22 RX ADMIN — PIPERACILLIN AND TAZOBACTAM 25 GRAM(S): 4; .5 INJECTION, POWDER, LYOPHILIZED, FOR SOLUTION INTRAVENOUS at 06:00

## 2023-06-22 RX ADMIN — Medication 1 TABLET(S): at 06:00

## 2023-06-22 RX ADMIN — PIPERACILLIN AND TAZOBACTAM 25 GRAM(S): 4; .5 INJECTION, POWDER, LYOPHILIZED, FOR SOLUTION INTRAVENOUS at 14:00

## 2023-06-22 RX ADMIN — INSULIN GLARGINE 20 UNIT(S): 100 INJECTION, SOLUTION SUBCUTANEOUS at 21:37

## 2023-06-22 RX ADMIN — Medication 1: at 06:08

## 2023-06-22 RX ADMIN — LOSARTAN POTASSIUM 50 MILLIGRAM(S): 100 TABLET, FILM COATED ORAL at 06:00

## 2023-06-22 RX ADMIN — Medication 400 MILLIGRAM(S): at 06:00

## 2023-06-22 RX ADMIN — Medication 3: at 21:37

## 2023-06-22 RX ADMIN — AMLODIPINE BESYLATE 10 MILLIGRAM(S): 2.5 TABLET ORAL at 06:00

## 2023-06-22 RX ADMIN — Medication 400 MILLIGRAM(S): at 17:08

## 2023-06-22 RX ADMIN — Medication 400 MILLIGRAM(S): at 13:58

## 2023-06-22 RX ADMIN — HEPARIN SODIUM 5000 UNIT(S): 5000 INJECTION INTRAVENOUS; SUBCUTANEOUS at 17:07

## 2023-06-22 RX ADMIN — Medication 5: at 17:13

## 2023-06-22 NOTE — PROGRESS NOTE ADULT - NS ATTEND AMEND GEN_ALL_CORE FT
RUQ abdominal pain resolving. Breathing well. Underwent drainage of biloma yesterday with IR.     RUQ drain with scant bilious output.   Steristrips intact. No significant TTP.     Labs reviewed.     69yo woman POD 7 lap shawn. Course complicated by biloma.   - Appreciate IR tube placement.   - Continue abx for total of 4 days.   - Monitor drain output.   - Possible dc home tomorrow.

## 2023-06-23 VITALS
SYSTOLIC BLOOD PRESSURE: 127 MMHG | RESPIRATION RATE: 18 BRPM | OXYGEN SATURATION: 96 % | HEART RATE: 81 BPM | TEMPERATURE: 97 F | DIASTOLIC BLOOD PRESSURE: 77 MMHG

## 2023-06-23 LAB
ALBUMIN SERPL ELPH-MCNC: 2.5 G/DL — LOW (ref 3.3–5)
ALP SERPL-CCNC: 192 U/L — HIGH (ref 40–120)
ALT FLD-CCNC: 235 U/L — HIGH (ref 12–78)
ANION GAP SERPL CALC-SCNC: 2 MMOL/L — LOW (ref 5–17)
AST SERPL-CCNC: 94 U/L — HIGH (ref 15–37)
BILIRUB DIRECT SERPL-MCNC: 0.2 MG/DL — SIGNIFICANT CHANGE UP (ref 0–0.3)
BILIRUB INDIRECT FLD-MCNC: 0.2 MG/DL — SIGNIFICANT CHANGE UP (ref 0.2–1)
BILIRUB SERPL-MCNC: 0.4 MG/DL — SIGNIFICANT CHANGE UP (ref 0.2–1.2)
BUN SERPL-MCNC: 18 MG/DL — SIGNIFICANT CHANGE UP (ref 7–23)
CALCIUM SERPL-MCNC: 9.8 MG/DL — SIGNIFICANT CHANGE UP (ref 8.5–10.1)
CHLORIDE SERPL-SCNC: 108 MMOL/L — SIGNIFICANT CHANGE UP (ref 96–108)
CO2 SERPL-SCNC: 24 MMOL/L — SIGNIFICANT CHANGE UP (ref 22–31)
CREAT SERPL-MCNC: 1.54 MG/DL — HIGH (ref 0.5–1.3)
EGFR: 36 ML/MIN/1.73M2 — LOW
GLUCOSE BLDC GLUCOMTR-MCNC: 292 MG/DL — HIGH (ref 70–99)
GLUCOSE BLDC GLUCOMTR-MCNC: 344 MG/DL — HIGH (ref 70–99)
GLUCOSE BLDC GLUCOMTR-MCNC: 373 MG/DL — HIGH (ref 70–99)
GLUCOSE SERPL-MCNC: 353 MG/DL — HIGH (ref 70–99)
HCT VFR BLD CALC: 28.3 % — LOW (ref 34.5–45)
HGB BLD-MCNC: 8.8 G/DL — LOW (ref 11.5–15.5)
MAGNESIUM SERPL-MCNC: 1.9 MG/DL — SIGNIFICANT CHANGE UP (ref 1.6–2.6)
MCHC RBC-ENTMCNC: 26 PG — LOW (ref 27–34)
MCHC RBC-ENTMCNC: 31.1 G/DL — LOW (ref 32–36)
MCV RBC AUTO: 83.7 FL — SIGNIFICANT CHANGE UP (ref 80–100)
NRBC # BLD: 0 /100 WBCS — SIGNIFICANT CHANGE UP (ref 0–0)
PHOSPHATE SERPL-MCNC: 2.2 MG/DL — LOW (ref 2.5–4.5)
PLATELET # BLD AUTO: 532 K/UL — HIGH (ref 150–400)
POTASSIUM SERPL-MCNC: 4.6 MMOL/L — SIGNIFICANT CHANGE UP (ref 3.5–5.3)
POTASSIUM SERPL-SCNC: 4.6 MMOL/L — SIGNIFICANT CHANGE UP (ref 3.5–5.3)
PROT SERPL-MCNC: 7.2 GM/DL — SIGNIFICANT CHANGE UP (ref 6–8.3)
RBC # BLD: 3.38 M/UL — LOW (ref 3.8–5.2)
RBC # FLD: 12.8 % — SIGNIFICANT CHANGE UP (ref 10.3–14.5)
SODIUM SERPL-SCNC: 134 MMOL/L — LOW (ref 135–145)
WBC # BLD: 10.28 K/UL — SIGNIFICANT CHANGE UP (ref 3.8–10.5)
WBC # FLD AUTO: 10.28 K/UL — SIGNIFICANT CHANGE UP (ref 3.8–10.5)

## 2023-06-23 PROCEDURE — 99233 SBSQ HOSP IP/OBS HIGH 50: CPT

## 2023-06-23 RX ORDER — ACETAMINOPHEN 500 MG
3 TABLET ORAL
Qty: 0 | Refills: 0 | DISCHARGE

## 2023-06-23 RX ADMIN — Medication 400 MILLIGRAM(S): at 00:16

## 2023-06-23 RX ADMIN — Medication 3: at 08:05

## 2023-06-23 RX ADMIN — SODIUM CHLORIDE 120 MILLILITER(S): 9 INJECTION, SOLUTION INTRAVENOUS at 05:27

## 2023-06-23 RX ADMIN — Medication 1 TABLET(S): at 05:27

## 2023-06-23 RX ADMIN — Medication 6 UNIT(S): at 08:05

## 2023-06-23 RX ADMIN — Medication 1 TABLET(S): at 15:10

## 2023-06-23 RX ADMIN — Medication 6 UNIT(S): at 11:13

## 2023-06-23 RX ADMIN — AMLODIPINE BESYLATE 10 MILLIGRAM(S): 2.5 TABLET ORAL at 05:27

## 2023-06-23 RX ADMIN — PIPERACILLIN AND TAZOBACTAM 25 GRAM(S): 4; .5 INJECTION, POWDER, LYOPHILIZED, FOR SOLUTION INTRAVENOUS at 05:28

## 2023-06-23 RX ADMIN — HEPARIN SODIUM 5000 UNIT(S): 5000 INJECTION INTRAVENOUS; SUBCUTANEOUS at 05:27

## 2023-06-23 RX ADMIN — Medication 400 MILLIGRAM(S): at 05:27

## 2023-06-23 RX ADMIN — LOSARTAN POTASSIUM 50 MILLIGRAM(S): 100 TABLET, FILM COATED ORAL at 05:28

## 2023-06-23 RX ADMIN — Medication 5: at 11:13

## 2023-06-23 RX ADMIN — Medication 400 MILLIGRAM(S): at 11:17

## 2023-06-23 NOTE — PROGRESS NOTE ADULT - NS ATTEND AMEND GEN_ALL_CORE FT
No abominal pain. Tolerating diet. Last bowel movement two days prior. Passing flatus. Afebrile.     Drain with scant mj bilious drainage.   Steristrips intact. No abd distention.     Labs reviewed.     67yo woman s/p lap shawn complicated by cystic duct stump leak requiring IR drainage of biloma.   - DC home today with drain.   - F/u in office early next week.   - GI colleague contacted should patient require ERCP.   - Antibiotics for two more days. No abominal pain. Tolerating diet. Last bowel movement two days prior. Passing flatus. Afebrile.     Drain with scant mj bilious drainage.   Steristrips intact. No abd distention.     Labs reviewed.     67yo woman s/p lap shawn complicated by cystic duct stump leak requiring IR drainage of biloma.   - DC home today with drain.   - F/u in office early next week.   - GI colleague (Dr. Haq) contacted should patient require ERCP.   - Antibiotics for two more days.

## 2023-06-23 NOTE — PROGRESS NOTE ADULT - SUBJECTIVE AND OBJECTIVE BOX
Interventional Radiology Follow-Up Note    This is a 68y Female s/p biloma drainage on  in Interventional Radiology with Dr. Goel.     S: Patient seen and examined @ bedside. No complaints offered.     Medication:   amLODIPine   Tablet: ()  losartan: ()  piperacillin/tazobactam IVPB..: ()  triamterene 37.5 mG/hydrochlorothiazide 25 mG Tablet: ()    Vitals:   T(F): 97.9, Max: 98.4 (23:43)  HR: 74  BP: 110/67  RR: 17  SpO2: 96%    Physical Exam:  General: Nontoxic, in NAD.  Abdomen: soft, ND. +TTP at drain insertion site  Drain Device: Drain intact attached to gravity drainage bag. Dressing clean, dry, intact. Flushed with 10cc NS, no leakage or resistance noted. +fluid return in tubing.     24hr Drain output: 5cc recorded in [place], 50cc noted in drainage bag at bedside.     LABS:  WBC 10.37 / Hgb 8.7 / Hct 27.3 / Plt 502  Na 135 / K 4.4 / CO2 25 / Cl 109 / BUN 24 / Cr 1.27 / Glucose 141   /  / Alk Phos 182 / Tbili 0.6  Ptt -- / Pt -- / INR --    < from: CT Abdomen and Pelvis w/ IV Cont (23 @ 14:21) >  IMPRESSION:  Fine linear defect in the hepatic parenchyma may represent a laceration.    9.8 x 2.7 x 4.4 cm fluid collection in the operative bed may represent a   liquefied hematoma, seroma, abscess or biloma. If a bile leak is of   clinical concern, consider nuclear DISIDA scan for confirmation.    Developing right pleural effusion with underlying passive atelectasis.    Prominent uterus for patient age.    < end of copied text >        Assessment/Plan:  68F PMHx HTN, asthma, DM,  x 3, palpitations c/o RUQ pain since yesterday. Admitted with acute cholecystitis, LUDWIG. Now POD#7 s/p lap shawn. US : Approximately 6.2 x 2.4 x 3.8 cm mildly complex collection in the surgical bed. CT : 9.8 x 2.7 x 4.4 cm fluid collection in the operative bed may represent a   liquefied hematoma, seroma, abscess or biloma. Pt most recently s/p biloma drainage on  in Interventional Radiology with Dr. Goel.     -trend vs/labs  -flush drain with 10cc NS daily forward only; DO NOT aspirate  -change dressing q3 days or when dressing is saturated  -Continue to monitor and record daily output. When drainage output <10cc/day x 2-3 consecutive days, please obtain repeat imaging (CT abd/pelv non con) and IR to perform drain study. This may be done outpatient if patient is pending discharge.   -regarding outpatient follow up with IR, if the patient is d/c home with drainage catheter they can make an appointment with IR by calling the IR booking office at [phone]; recommend IR follow in 1 week for tube evaluation.  -Greater than 50% of the encounter was spent counseling and/or coordination of care on drain management and follow up.   -they will benefit from VNS service to help with drainage catheter care; they should continue same drainage catheter care as an outpatient.  -continue global management per primary team     Please call IR at extension [phone] with any questions, concerns, or issues regarding above.      
Patient is a 68y old  Female who presents with a chief complaint of Acute cholecystitis (14 Jun 2023 15:13)    INTERVAL HPI/OVERNIGHT EVENTS: no events     MEDICATIONS  (STANDING):  amLODIPine   Tablet 10 milliGRAM(s) Oral daily  dextrose 5%. 1000 milliLiter(s) (100 mL/Hr) IV Continuous <Continuous>  dextrose 5%. 1000 milliLiter(s) (50 mL/Hr) IV Continuous <Continuous>  dextrose 50% Injectable 25 Gram(s) IV Push once  dextrose 50% Injectable 25 Gram(s) IV Push once  dextrose 50% Injectable 12.5 Gram(s) IV Push once  glucagon  Injectable 1 milliGRAM(s) IntraMuscular once  heparin   Injectable 5000 Unit(s) SubCutaneous every 12 hours  insulin lispro (ADMELOG) corrective regimen sliding scale   SubCutaneous every 6 hours  lactated ringers. 1000 milliLiter(s) (120 mL/Hr) IV Continuous <Continuous>  losartan 50 milliGRAM(s) Oral daily  piperacillin/tazobactam IVPB.. 3.375 Gram(s) IV Intermittent every 8 hours  triamterene 37.5 mG/hydrochlorothiazide 25 mG Tablet 1 Tablet(s) Oral daily    MEDICATIONS  (PRN):  albuterol    90 MICROgram(s) HFA Inhaler 2 Puff(s) Inhalation every 6 hours PRN Shortness of Breath and/or Wheezing  dextrose Oral Gel 15 Gram(s) Oral once PRN Blood Glucose LESS THAN 70 milliGRAM(s)/deciliter  morphine  - Injectable 2 milliGRAM(s) IV Push every 4 hours PRN Moderate Pain (4 - 6)  ondansetron Injectable 4 milliGRAM(s) IV Push every 6 hours PRN Nausea    Allergies    No Known Allergies    Intolerances      REVIEW OF SYSTEMS:  All other systems reviewed and are negative    Vital Signs Last 24 Hrs  T(C): 36.9 (15 Thanh 2023 12:58), Max: 37.6 (14 Jun 2023 23:27)  T(F): 98.5 (15 Thanh 2023 12:58), Max: 99.7 (14 Jun 2023 23:27)  HR: 101 (15 Thanh 2023 12:58) (88 - 101)  BP: 138/73 (15 Thanh 2023 12:58) (119/62 - 147/80)  BP(mean): --  RR: 18 (15 Thanh 2023 12:58) (16 - 18)  SpO2: 98% (15 Thanh 2023 12:58) (92% - 98%)      Daily     Daily   I&O's Summary    CAPILLARY BLOOD GLUCOSE      POCT Blood Glucose.: 220 mg/dL (15 Thanh 2023 11:25)  POCT Blood Glucose.: 244 mg/dL (15 Thanh 2023 06:04)  POCT Blood Glucose.: 233 mg/dL (15 Thanh 2023 00:28)  POCT Blood Glucose.: 258 mg/dL (14 Jun 2023 18:07)    PHYSICAL EXAM:  GENERAL: NAD,    HEAD:  Atraumatic, Normocephalic  EYES: EOMI, PERRLA, conjunctiva and sclera clear  ENMT: No tonsillar erythema, exudates, or enlargement; Moist mucous membranes, Good dentition, No lesions  NECK: Supple, No JVD, Normal thyroid  NERVOUS SYSTEM:  Alert & Oriented X3, Good concentration; Motor Strength 5/5 B/L upper and lower extremities; DTRs 2+ intact and symmetric  CHEST/LUNG: Clear to percussion bilaterally; No rales, rhonchi, wheezing, or rubs  HEART: Regular rate and rhythm; No murmurs, rubs, or gallops  ABDOMEN: Soft, ruq tender, Nondistended; Bowel sounds present  EXTREMITIES:  2+ Peripheral Pulses, No clubbing, cyanosis, or edema  LYMPH: No lymphadenopathy noted  SKIN: No rashes or lesions    Labs                          10.9   21.52 )-----------( 179      ( 15 Thanh 2023 06:25 )             33.9     06-15    133<L>  |  100  |  11  ----------------------------<  226<H>  4.1   |  24  |  1.57<H>    Ca    9.4      15 Thanh 2023 06:25  Phos  2.1     06-15  Mg     1.7     06-15    TPro  6.8  /  Alb  2.7<L>  /  TBili  1.1  /  DBili  0.3  /  AST  43<H>  /  ALT  65  /  AlkPhos  82  06-15              Culture - Urine (collected 12 Jun 2023 23:30)  Source: Clean Catch Clean Catch (Midstream)  Final Report (15 Thanh 2023 08:46):    <10,000 CFU/mL Normal Urogenital Pat                DVT prophylaxis: > Lovenox 40mg SQ daily  > Heparin   > SCD's
Patient is a 68y old  Female who presents with a chief complaint of Acute cholecystitis (16 Jun 2023 12:33)    INTERVAL HPI/OVERNIGHT EVENTS: no events     MEDICATIONS  (STANDING):  amLODIPine   Tablet 10 milliGRAM(s) Oral daily  dextrose 5%. 1000 milliLiter(s) (50 mL/Hr) IV Continuous <Continuous>  dextrose 5%. 1000 milliLiter(s) (100 mL/Hr) IV Continuous <Continuous>  dextrose 50% Injectable 25 Gram(s) IV Push once  dextrose 50% Injectable 12.5 Gram(s) IV Push once  dextrose 50% Injectable 25 Gram(s) IV Push once  glucagon  Injectable 1 milliGRAM(s) IntraMuscular once  heparin   Injectable 5000 Unit(s) SubCutaneous every 8 hours  insulin glargine Injectable (LANTUS) 15 Unit(s) SubCutaneous at bedtime  insulin glargine Injectable (LANTUS) 25 Unit(s) SubCutaneous once  insulin lispro (ADMELOG) corrective regimen sliding scale   SubCutaneous every 6 hours  insulin lispro Injectable (ADMELOG) 4 Unit(s) SubCutaneous three times a day before meals  losartan 50 milliGRAM(s) Oral daily  piperacillin/tazobactam IVPB.. 3.375 Gram(s) IV Intermittent every 8 hours  triamterene 37.5 mG/hydrochlorothiazide 25 mG Tablet 1 Tablet(s) Oral daily    MEDICATIONS  (PRN):  albuterol    90 MICROgram(s) HFA Inhaler 2 Puff(s) Inhalation every 6 hours PRN Shortness of Breath and/or Wheezing  dextrose Oral Gel 15 Gram(s) Oral once PRN Blood Glucose LESS THAN 70 milliGRAM(s)/deciliter  morphine  - Injectable 2 milliGRAM(s) IV Push every 4 hours PRN Moderate Pain (4 - 6)  ondansetron Injectable 4 milliGRAM(s) IV Push every 6 hours PRN Nausea  oxyCODONE    IR 5 milliGRAM(s) Oral every 6 hours PRN Moderate Pain (4 - 6)    Allergies    No Known Allergies    Intolerances      REVIEW OF SYSTEMS:  All other systems reviewed and are negative    Vital Signs Last 24 Hrs  T(C): 36.8 (17 Jun 2023 05:05), Max: 36.8 (17 Jun 2023 05:05)  T(F): 98.2 (17 Jun 2023 05:05), Max: 98.2 (17 Jun 2023 05:05)  HR: 79 (17 Jun 2023 05:05) (79 - 91)  BP: 105/67 (17 Jun 2023 05:05) (105/67 - 122/72)  BP(mean): --  RR: 18 (17 Jun 2023 05:05) (17 - 18)  SpO2: 94% (17 Jun 2023 05:05) (93% - 96%)    Parameters below as of 16 Jun 2023 12:00  Patient On (Oxygen Delivery Method): room air      Daily     Daily   I&O's Summary    16 Jun 2023 07:01  -  17 Jun 2023 07:00  --------------------------------------------------------  IN: 700 mL / OUT: 400 mL / NET: 300 mL      CAPILLARY BLOOD GLUCOSE      POCT Blood Glucose.: 311 mg/dL (17 Jun 2023 07:37)  POCT Blood Glucose.: 267 mg/dL (16 Jun 2023 21:17)  POCT Blood Glucose.: 292 mg/dL (16 Jun 2023 19:04)  POCT Blood Glucose.: 339 mg/dL (16 Jun 2023 16:00)  POCT Blood Glucose.: 406 mg/dL (16 Jun 2023 11:15)    PHYSICAL EXAM:  GENERAL: NAD,    HEAD:  Atraumatic, Normocephalic  EYES: EOMI, PERRLA, conjunctiva and sclera clear  ENMT: No tonsillar erythema, exudates, or enlargement; Moist mucous membranes, Good dentition, No lesions  NECK: Supple, No JVD, Normal thyroid  NERVOUS SYSTEM:  Alert & Oriented X3, Good concentration; Motor Strength 5/5 B/L upper and lower extremities; DTRs 2+ intact and symmetric  CHEST/LUNG: Clear to percussion bilaterally; No rales, rhonchi, wheezing, or rubs  HEART: Regular rate and rhythm; No murmurs, rubs, or gallops  ABDOMEN: Soft, Nontender, Nondistended; Bowel sounds present  EXTREMITIES:  2+ Peripheral Pulses, No clubbing, cyanosis, or edema  LYMPH: No lymphadenopathy noted  SKIN: No rashes or lesions    Labs                          9.4    15.23 )-----------( 238      ( 17 Jun 2023 06:30 )             29.4     06-17    134<L>  |  104  |  30<H>  ----------------------------<  324<H>  4.9   |  24  |  1.72<H>    Ca    9.5      17 Jun 2023 06:30  Phos  1.8     06-17  Mg     2.2     06-17    TPro  6.6  /  Alb  2.3<L>  /  TBili  0.9  /  DBili  0.3  /  AST  77<H>  /  ALT  101<H>  /  AlkPhos  81  06-16                        DVT prophylaxis: > Lovenox 40mg SQ daily  > Heparin   > SCD's
Patient s/p lap shawn POD #2 doing well however wbc and glucose remain elevated.     PHYSICAL EXAM:    Vital Signs Last 24 Hrs  T(C): 36.9 (17 Jun 2023 12:06), Max: 36.9 (17 Jun 2023 12:06)  T(F): 98.4 (17 Jun 2023 12:06), Max: 98.4 (17 Jun 2023 12:06)  HR: 85 (17 Jun 2023 12:06) (79 - 91)  BP: 113/70 (17 Jun 2023 12:06) (105/67 - 122/72)  BP(mean): --  RR: 17 (17 Jun 2023 12:06) (17 - 18)  SpO2: 97% (17 Jun 2023 12:06) (93% - 97%)    Parameters below as of 17 Jun 2023 12:06  Patient On (Oxygen Delivery Method): room air    MEDICATIONS  (STANDING):  amLODIPine   Tablet 10 milliGRAM(s) Oral daily  dextrose 5%. 1000 milliLiter(s) (50 mL/Hr) IV Continuous <Continuous>  dextrose 5%. 1000 milliLiter(s) (100 mL/Hr) IV Continuous <Continuous>  dextrose 50% Injectable 25 Gram(s) IV Push once  dextrose 50% Injectable 12.5 Gram(s) IV Push once  dextrose 50% Injectable 25 Gram(s) IV Push once  glucagon  Injectable 1 milliGRAM(s) IntraMuscular once  heparin   Injectable 5000 Unit(s) SubCutaneous every 8 hours  insulin glargine Injectable (LANTUS) 15 Unit(s) SubCutaneous at bedtime  insulin lispro (ADMELOG) corrective regimen sliding scale   SubCutaneous every 6 hours  insulin lispro Injectable (ADMELOG) 4 Unit(s) SubCutaneous three times a day before meals  losartan 50 milliGRAM(s) Oral daily  piperacillin/tazobactam IVPB.. 3.375 Gram(s) IV Intermittent every 8 hours  triamterene 37.5 mG/hydrochlorothiazide 25 mG Tablet 1 Tablet(s) Oral daily    MEDICATIONS  (PRN):  albuterol    90 MICROgram(s) HFA Inhaler 2 Puff(s) Inhalation every 6 hours PRN Shortness of Breath and/or Wheezing  dextrose Oral Gel 15 Gram(s) Oral once PRN Blood Glucose LESS THAN 70 milliGRAM(s)/deciliter  morphine  - Injectable 2 milliGRAM(s) IV Push every 4 hours PRN Moderate Pain (4 - 6)  ondansetron Injectable 4 milliGRAM(s) IV Push every 6 hours PRN Nausea  oxyCODONE    IR 5 milliGRAM(s) Oral every 6 hours PRN Moderate Pain (4 - 6)      General:  A & O x3, NAD  Lungs:  CTAB  Cardiovascular:  good S1, S2,   Abdomen:  Soft, non-tender, non-distended, normoactive bowel sounds, incisions c/d/i   Extremities:  no calf swelling/tenderness b/l        LABS:                        9.4    15.23 )-----------( 238      ( 17 Jun 2023 06:30 )             29.4     06-17    134<L>  |  104  |  30<H>  ----------------------------<  324<H>  4.9   |  24  |  1.72<H>    Ca    9.5      17 Jun 2023 06:30  Phos  1.8     06-17  Mg     2.2     06-17    TPro  6.6  /  Alb  2.3<L>  /  TBili  0.9  /  DBili  0.3  /  AST  77<H>  /  ALT  101<H>  /  AlkPhos  81  06-16              
Patient seen and  examined at bedside resting comfortably. Admits to abdominal discomfort but offers no other complaints.   Tolerating diet, with bowel function.   Denies fever, chills, N/V/D, CP, SOB.     Vital Signs Last 24 Hrs  T(F): 97.8 (06-16-23 @ 12:00), Max: 98.7 (06-15-23 @ 17:31)  HR: 84 (06-16-23 @ 12:00)  BP: 117/70 (06-16-23 @ 12:00)  RR: 17 (06-16-23 @ 12:00)  SpO2: 94% (06-16-23 @ 12:00)  POCT Blood Glucose.: 406 mg/dL (16 Jun 2023 11:15)    PHYSICAL EXAM:  GENERAL: Alert, NAD  CHEST/LUNG: Clear to auscultation bilaterally, respirations nonlabored  HEART: Regular rate and rhythm; S1 & S2 appreciated  ABDOMEN: soft, appropriate incisional tenderness, non distended. steri strips x4 c/d/i  EXTREMITIES:  no calf tenderness, No edema    I&O's Detail    15 Thanh 2023 07:01  -  16 Jun 2023 07:00  --------------------------------------------------------  IN:  Total IN: 0 mL    OUT:    Voided (mL): 600 mL  Total OUT: 600 mL    Total NET: -600 mL      16 Jun 2023 07:01  -  16 Jun 2023 12:34  --------------------------------------------------------  IN:    Oral Fluid: 400 mL  Total IN: 400 mL    OUT:  Total OUT: 0 mL    Total NET: 400 mL          LABS:                        9.8    15.10 )-----------( 293      ( 16 Jun 2023 05:19 )             30.0     06-16    133<L>  |  98  |  22  ----------------------------<  368<H>  4.1   |  27  |  1.88<H>    Ca    8.9      16 Jun 2023 05:19  Phos  2.3     06-16  Mg     1.7     06-16    TPro  6.6  /  Alb  2.3<L>  /  TBili  0.9  /  DBili  0.3  /  AST  77<H>  /  ALT  101<H>  /  AlkPhos  81  06-16    A/P  68F S/P laparoscopic cholecystectomy POD#1  Hyperglycemic    - continue zosyn  - local wound care per surgery  - consistent carb/low fat diet  - DVT ppx, OOB/AAT  - [facility] medicine recs for glucose management  - d/w Dr. Granados     
Patient seen and  examined at bedside resting comfortably. Offers no complaints, states she feels better today.   Tolerating diet, with bowel function.   Denies fever, chills, N/V/D, CP, SOB.    Vital Signs Last 24 Hrs  T(F): 99 (06-20-23 @ 05:13), Max: 99 (06-20-23 @ 05:13)  HR: 96 (06-20-23 @ 05:13)  BP: 159/77 (06-20-23 @ 05:13)  RR: 18 (06-20-23 @ 05:13)  SpO2: 95% (06-20-23 @ 05:13)  POCT Blood Glucose.: 144 mg/dL (20 Jun 2023 07:41)    PHYSICAL EXAM:  GENERAL: Alert, NAD  CHEST/LUNG: Clear to auscultation bilaterally, respirations nonlabored  HEART: Regular rate and rhythm; S1 & S2 appreciated  ABDOMEN: soft, non tender, non distended. Incision sites covered with steri strips, c/d/i  EXTREMITIES:  no calf tenderness, No edema    I&O's Detail      LABS:                        8.8    10.75 )-----------( 379      ( 19 Jun 2023 07:10 )             27.2     06-19    138  |  107  |  18  ----------------------------<  134<H>  3.7   |  29  |  1.25    Ca    9.6      19 Jun 2023 07:10  Phos  3.9     06-19  Mg     1.9     06-19    A/P  68 year old female POD #5 s/p lap shawn   - regular diet  - analgesia prn  - DVT ppx, OOB/AAT, IS  - awaiting AM labs  - d/c planning when home PT is set up  - will d/w Dr. Gallardo     
Pre-operative Note    - Pre-operative Diagnosis: acute cholecystitis     - Procedure: laparoscopic cholecystectomy, possible open    - Labs:                        10.9   21.52 )-----------( 179      ( 15 Thanh 2023 06:25 )             33.9     06-15    133<L>  |  100  |  11  ----------------------------<  226<H>  4.1   |  24  |  1.57<H>    Ca    9.4      15 Thanh 2023 06:25  Phos  2.1     06-15  Mg     1.7     06-15    TPro  6.8  /  Alb  2.7<L>  /  TBili  1.1  /  DBili  0.3  /  AST  43<H>  /  ALT  65  /  AlkPhos  82  06-15      Type & Screen #1: O POS  Type & Screen #2: O POS    - Blood: Not needed.     - Orders:  > NPO  > zosyn      - Permits:  > Consent in chart.  > Case scheduled with OR.    
Surgery NP note  Patient seen and examined bedside  RUQ Abdominal pain is well controlled.  Denies nausea and vomiting. NPO    T(F): 98.7 (06-14-23 @ 11:31), Max: 99.5 (06-13-23 @ 23:30)  HR: 86 (06-14-23 @ 11:31) (81 - 86)  BP: 144/81 (06-14-23 @ 11:31) (144/79 - 156/81)  RR: 18 (06-14-23 @ 11:31) (18 - 18)  SpO2: 97% (06-14-23 @ 11:31) (96% - 97%)  Wt(kg): --  CAPILLARY BLOOD GLUCOSE      POCT Blood Glucose.: 320 mg/dL (14 Jun 2023 10:49)  POCT Blood Glucose.: 305 mg/dL (14 Jun 2023 06:02)  POCT Blood Glucose.: 262 mg/dL (14 Jun 2023 00:04)  POCT Blood Glucose.: 289 mg/dL (13 Jun 2023 17:04)      PHYSICAL EXAM:  General: NAD, WDWN.   Neuro:  Alert & responsive  HEENT: NCAT, EOMI, conjunctiva clear  CV: +S1+S2 regular rate and rhythm  Lung: clear to ausculation bilaterally, respirations nonlabored, good inspiratory effort  Abdomen: soft, +RUQ TTP, No Distension. Normal active BS  Extremities: no pedal edema or calf tenderness noted     LABS:                        10.4   13.36 )-----------( 269      ( 14 Jun 2023 05:30 )             32.4     06-14    134<L>  |  102  |  11  ----------------------------<  308<H>  4.4   |  30  |  1.39<H>    Ca    9.3      14 Jun 2023 05:30  Phos  2.3     06-14  Mg     2.0     06-14    TPro  7.0  /  Alb  3.1<L>  /  TBili  0.7  /  DBili  x   /  AST  25  /  ALT  34  /  AlkPhos  73  06-14    LIVER FUNCTIONS - ( 14 Jun 2023 05:30 )  Alb: 3.1 g/dL / Pro: 7.0 gm/dL / ALK PHOS: 73 U/L / ALT: 34 U/L / AST: 25 U/L / GGT: x           PT/INR - ( 13 Jun 2023 06:10 )   PT: 12.1 sec;   INR: 1.02 ratio         PTT - ( 13 Jun 2023 06:10 )  PTT:31.0 sec  I&O's Detail    
HPI: 68 year old female POD #4 s/p lap cholecystectomy, complains of pain under right rib cage with deep breathing. Not utilizing incentive spirometry as effectively as possible.     Vital Signs Last 24 Hrs  T(C): 36.7 (18 Jun 2023 23:22), Max: 36.9 (18 Jun 2023 15:12)  T(F): 98.1 (18 Jun 2023 23:22), Max: 98.4 (18 Jun 2023 15:12)  HR: 83 (18 Jun 2023 23:22) (76 - 85)  BP: 159/79 (18 Jun 2023 23:22) (121/75 - 159/79)  BP(mean): --  RR: 18 (18 Jun 2023 23:22) (16 - 18)  SpO2: 96% (18 Jun 2023 23:22) (94% - 98%)    Parameters below as of 18 Jun 2023 17:03  Patient On (Oxygen Delivery Method): room air        MEDICATIONS  (STANDING):  acetaminophen     Tablet .. 975 milliGRAM(s) Oral every 6 hours  amLODIPine   Tablet 10 milliGRAM(s) Oral daily  dextrose 5%. 1000 milliLiter(s) (50 mL/Hr) IV Continuous <Continuous>  dextrose 5%. 1000 milliLiter(s) (100 mL/Hr) IV Continuous <Continuous>  dextrose 50% Injectable 25 Gram(s) IV Push once  dextrose 50% Injectable 12.5 Gram(s) IV Push once  dextrose 50% Injectable 25 Gram(s) IV Push once  glucagon  Injectable 1 milliGRAM(s) IntraMuscular once  heparin   Injectable 5000 Unit(s) SubCutaneous every 8 hours  insulin glargine Injectable (LANTUS) 20 Unit(s) SubCutaneous at bedtime  insulin lispro (ADMELOG) corrective regimen sliding scale   SubCutaneous every 6 hours  insulin lispro Injectable (ADMELOG) 6 Unit(s) SubCutaneous three times a day before meals  losartan 50 milliGRAM(s) Oral daily  piperacillin/tazobactam IVPB.. 3.375 Gram(s) IV Intermittent every 8 hours  potassium phosphate / sodium phosphate Powder (PHOS-NaK) 1 Packet(s) Oral once  triamterene 37.5 mG/hydrochlorothiazide 25 mG Tablet 1 Tablet(s) Oral daily    MEDICATIONS  (PRN):  albuterol    90 MICROgram(s) HFA Inhaler 2 Puff(s) Inhalation every 6 hours PRN Shortness of Breath and/or Wheezing  dextrose Oral Gel 15 Gram(s) Oral once PRN Blood Glucose LESS THAN 70 milliGRAM(s)/deciliter  ondansetron Injectable 4 milliGRAM(s) IV Push every 6 hours PRN Nausea  oxyCODONE    IR 5 milliGRAM(s) Oral every 6 hours PRN Moderate Pain (4 - 6)      PHYSICAL EXAM:      Constitutional: awake and alert.  HEENT: PERRLA, EOMI,   Neck: Supple.  Respiratory: Breath sounds are clear bilaterally  Cardiovascular: S1 and S2, regular   Gastrointestinal: soft, nontender to palpation, port sites c/d/i not distended +bsx4  Extremities:  no edema  Musculoskeletal: no joint swelling/tenderness, no abnormal movements  Skin: No rashes      LABS:                         9.0    12.90 )-----------( 361      ( 18 Jun 2023 07:10 )             27.7     06-18    140  |  108  |  25<H>  ----------------------------<  109<H>  4.3   |  30  |  1.56<H>    Ca    10.3<H>      18 Jun 2023 07:10  Phos  2.3     06-18  Mg     2.1     06-18    EKG done at bedside NSR rate 84  
SURGERY PROGRESS HPI:  Pt seen and examined at bedside. Pain is well controlled on pain medication. Pt denies complaints. Pt tolerating diet. Pt denies nausea and vomiting. Voiding well. Pt denies chest pain, SOB, dizziness, fever, chills. Ambulating.    Vital Signs Last 24 Hrs  T(C): 36.3 (21 Jun 2023 05:05), Max: 36.9 (20 Jun 2023 16:23)  T(F): 97.4 (21 Jun 2023 05:05), Max: 98.4 (20 Jun 2023 16:23)  HR: 83 (21 Jun 2023 05:05) (83 - 85)  BP: 139/73 (21 Jun 2023 05:05) (111/63 - 139/73)  BP(mean): --  RR: 16 (21 Jun 2023 05:05) (16 - 18)  SpO2: 94% (21 Jun 2023 05:05) (94% - 97%)    Parameters below as of 21 Jun 2023 05:05  Patient On (Oxygen Delivery Method): room air      PHYSICAL EXAM:    CONSTITUTIONAL: NAD  HEENT:  Atraumatic, Normocephalic  RESPIRATORY: Clear to ausculation, bilaterally   CARDIOVASCULAR: RRR S1S2  GASTROINTESTINAL: Steri strips clean/dry/intact. non distended, +BS, soft, appropriate incisional tenderness  MUSCULOSKELETAL: calf soft, non tender b/l    I&O's Detail      LABS:                        9.1    13.40 )-----------( 450      ( 20 Jun 2023 14:53 )             28.0     06-20    134<L>  |  105  |  19  ----------------------------<  208<H>  4.4   |  28  |  1.29    Ca    9.7      20 Jun 2023 14:53    TPro  7.1  /  Alb  2.3<L>  /  TBili  0.4  /  DBili  0.2  /  AST  70<H>  /  ALT  119<H>  /  AlkPhos  149<H>  06-20    < from: US Abdomen Limited (06.20.23 @ 18:42) >  FINDINGS:  Evaluation is degraded by bowel gas.  Liver: Within normal limits.  Bile ducts: Normal caliber. Common bile duct measures 3 mm.  Gallbladder: Status post cholecystectomy.  Approximately 6.2 x 2.4 x 3.8   cm hypoechoic collection in the surgical bed with internal echoes.  Color   Doppler images of the collection appear unremarkable.  Pancreas: Visualized portions are within normal limits.  Right kidney: 9.8 cm. No hydronephrosis.  Ascites: None.  IVC: Visualized portions are within normal limits.    IMPRESSION: Status post cholecystectomy.  Approximately 6.2 x 2.4 x 3.8   cm mildly complex collection in the surgical bed.  Differential diagnosis   is postoperative fluid and blood productsversus a small biloma.    < end of copied text >      A/P: 68 year old female POD#6 s/p lap shawn   US 6/20: Approximately 6.2 x 2.4 x 3.8 cm mildly complex collection in the surgical bed. Differential diagnosis is postoperative fluid and blood productsversus a small biloma. Dr. Gallardo aware of US last night.    - follow up AM labs. Possible CTAP and IR consult pending AM labs.  - Zosyn  - Holding lovenox for now  - NPO for now  - analgesia prn  - DVT ppx, OOB/AAT, IS  
Patient is a 68y old  Female who presents with a chief complaint of Acute cholecystitis (15 Thanh 2023 21:35)    INTERVAL HPI/OVERNIGHT EVENTS: no events     MEDICATIONS  (STANDING):  amLODIPine   Tablet 10 milliGRAM(s) Oral daily  dextrose 5%. 1000 milliLiter(s) (50 mL/Hr) IV Continuous <Continuous>  dextrose 5%. 1000 milliLiter(s) (100 mL/Hr) IV Continuous <Continuous>  dextrose 50% Injectable 25 Gram(s) IV Push once  dextrose 50% Injectable 12.5 Gram(s) IV Push once  dextrose 50% Injectable 25 Gram(s) IV Push once  glucagon  Injectable 1 milliGRAM(s) IntraMuscular once  heparin   Injectable 5000 Unit(s) SubCutaneous every 8 hours  insulin lispro (ADMELOG) corrective regimen sliding scale   SubCutaneous every 6 hours  insulin lispro Injectable (ADMELOG) 4 Unit(s) SubCutaneous three times a day before meals  lactated ringers. 1000 milliLiter(s) (120 mL/Hr) IV Continuous <Continuous>  losartan 50 milliGRAM(s) Oral daily  piperacillin/tazobactam IVPB.. 3.375 Gram(s) IV Intermittent every 8 hours  triamterene 37.5 mG/hydrochlorothiazide 25 mG Tablet 1 Tablet(s) Oral daily    MEDICATIONS  (PRN):  albuterol    90 MICROgram(s) HFA Inhaler 2 Puff(s) Inhalation every 6 hours PRN Shortness of Breath and/or Wheezing  dextrose Oral Gel 15 Gram(s) Oral once PRN Blood Glucose LESS THAN 70 milliGRAM(s)/deciliter  morphine  - Injectable 2 milliGRAM(s) IV Push every 4 hours PRN Moderate Pain (4 - 6)  ondansetron Injectable 4 milliGRAM(s) IV Push every 6 hours PRN Nausea  oxyCODONE    IR 5 milliGRAM(s) Oral every 6 hours PRN Moderate Pain (4 - 6)    Allergies    No Known Allergies    Intolerances      REVIEW OF SYSTEMS:  All other systems reviewed and are negative    Vital Signs Last 24 Hrs  T(C): 36.6 (16 Jun 2023 08:39), Max: 37.1 (15 Thanh 2023 17:31)  T(F): 97.9 (16 Jun 2023 08:39), Max: 98.7 (15 Thanh 2023 17:31)  HR: 87 (16 Jun 2023 08:39) (87 - 102)  BP: 128/77 (16 Jun 2023 08:39) (123/75 - 152/85)  BP(mean): --  RR: 17 (16 Jun 2023 08:39) (16 - 20)  SpO2: 95% (16 Jun 2023 08:39) (95% - 99%)    Parameters below as of 16 Jun 2023 08:39  Patient On (Oxygen Delivery Method): room air      Daily     Daily   I&O's Summary    15 Thanh 2023 07:01  -  16 Jun 2023 07:00  --------------------------------------------------------  IN: 0 mL / OUT: 600 mL / NET: -600 mL    16 Jun 2023 07:01  -  16 Jun 2023 10:49  --------------------------------------------------------  IN: 400 mL / OUT: 0 mL / NET: 400 mL      CAPILLARY BLOOD GLUCOSE      POCT Blood Glucose.: 342 mg/dL (16 Jun 2023 08:08)  POCT Blood Glucose.: 352 mg/dL (16 Jun 2023 06:15)  POCT Blood Glucose.: 417 mg/dL (16 Jun 2023 00:19)  POCT Blood Glucose.: 268 mg/dL (15 Thanh 2023 17:36)  POCT Blood Glucose.: 220 mg/dL (15 Thanh 2023 11:25)    PHYSICAL EXAM:  GENERAL: NAD,    HEAD:  Atraumatic, Normocephalic  EYES: EOMI, PERRLA, conjunctiva and sclera clear  ENMT: No tonsillar erythema, exudates, or enlargement; Moist mucous membranes, Good dentition, No lesions  NECK: Supple, No JVD, Normal thyroid  NERVOUS SYSTEM:  Alert & Oriented X3, Good concentration; Motor Strength 5/5 B/L upper and lower extremities; DTRs 2+ intact and symmetric  CHEST/LUNG: Clear to percussion bilaterally; No rales, rhonchi, wheezing, or rubs  HEART: Regular rate and rhythm; No murmurs, rubs, or gallops  ABDOMEN: Soft, Nontender, Nondistended; Bowel sounds present  EXTREMITIES:  2+ Peripheral Pulses, No clubbing, cyanosis, or edema  LYMPH: No lymphadenopathy noted  SKIN: No rashes or lesions    Labs                          9.8    15.10 )-----------( 293      ( 16 Jun 2023 05:19 )             30.0     06-16    133<L>  |  98  |  22  ----------------------------<  368<H>  4.1   |  27  |  1.88<H>    Ca    8.9      16 Jun 2023 05:19  Phos  2.3     06-16  Mg     1.7     06-16    TPro  6.6  /  Alb  2.3<L>  /  TBili  0.9  /  DBili  0.3  /  AST  77<H>  /  ALT  101<H>  /  AlkPhos  81  06-16                        DVT prophylaxis: > Lovenox 40mg SQ daily  > Heparin   > SCD's
Patient is a 68y old  Female who presents with a chief complaint of Acute cholecystitis (17 Jun 2023 16:13)    INTERVAL HPI/OVERNIGHT EVENTS:    MEDICATIONS  (STANDING):  acetaminophen     Tablet .. 975 milliGRAM(s) Oral every 6 hours  amLODIPine   Tablet 10 milliGRAM(s) Oral daily  dextrose 5%. 1000 milliLiter(s) (50 mL/Hr) IV Continuous <Continuous>  dextrose 5%. 1000 milliLiter(s) (100 mL/Hr) IV Continuous <Continuous>  dextrose 50% Injectable 25 Gram(s) IV Push once  dextrose 50% Injectable 12.5 Gram(s) IV Push once  dextrose 50% Injectable 25 Gram(s) IV Push once  glucagon  Injectable 1 milliGRAM(s) IntraMuscular once  heparin   Injectable 5000 Unit(s) SubCutaneous every 8 hours  insulin glargine Injectable (LANTUS) 20 Unit(s) SubCutaneous at bedtime  insulin lispro (ADMELOG) corrective regimen sliding scale   SubCutaneous every 6 hours  insulin lispro Injectable (ADMELOG) 6 Unit(s) SubCutaneous three times a day before meals  losartan 50 milliGRAM(s) Oral daily  piperacillin/tazobactam IVPB.. 3.375 Gram(s) IV Intermittent every 8 hours  triamterene 37.5 mG/hydrochlorothiazide 25 mG Tablet 1 Tablet(s) Oral daily    MEDICATIONS  (PRN):  albuterol    90 MICROgram(s) HFA Inhaler 2 Puff(s) Inhalation every 6 hours PRN Shortness of Breath and/or Wheezing  dextrose Oral Gel 15 Gram(s) Oral once PRN Blood Glucose LESS THAN 70 milliGRAM(s)/deciliter  ondansetron Injectable 4 milliGRAM(s) IV Push every 6 hours PRN Nausea  oxyCODONE    IR 5 milliGRAM(s) Oral every 6 hours PRN Moderate Pain (4 - 6)    Allergies    No Known Allergies    Intolerances      REVIEW OF SYSTEMS:  All other systems reviewed and are negative    Vital Signs Last 24 Hrs  T(C): 36.6 (18 Jun 2023 05:04), Max: 36.9 (17 Jun 2023 12:06)  T(F): 97.9 (18 Jun 2023 05:04), Max: 98.4 (17 Jun 2023 12:06)  HR: 76 (18 Jun 2023 05:04) (76 - 85)  BP: 127/74 (18 Jun 2023 05:04) (113/70 - 140/76)  BP(mean): --  RR: 16 (18 Jun 2023 05:04) (16 - 18)  SpO2: 95% (18 Jun 2023 05:04) (94% - 97%)    Parameters below as of 17 Jun 2023 17:18  Patient On (Oxygen Delivery Method): room air      Daily     Daily   I&O's Summary    17 Jun 2023 07:01  -  18 Jun 2023 07:00  --------------------------------------------------------  IN: 480 mL / OUT: 1500 mL / NET: -1020 mL      CAPILLARY BLOOD GLUCOSE      POCT Blood Glucose.: 89 mg/dL (18 Jun 2023 07:57)  POCT Blood Glucose.: 134 mg/dL (18 Jun 2023 06:22)  POCT Blood Glucose.: 308 mg/dL (18 Jun 2023 00:33)  POCT Blood Glucose.: 345 mg/dL (17 Jun 2023 21:19)  POCT Blood Glucose.: 366 mg/dL (17 Jun 2023 16:04)  POCT Blood Glucose.: 374 mg/dL (17 Jun 2023 11:25)    PHYSICAL EXAM:  GENERAL: NAD,    HEAD:  Atraumatic, Normocephalic  EYES: EOMI, PERRLA, conjunctiva and sclera clear  ENMT: No tonsillar erythema, exudates, or enlargement; Moist mucous membranes, Good dentition, No lesions  NECK: Supple, No JVD, Normal thyroid  NERVOUS SYSTEM:  Alert & Oriented X3, Good concentration; Motor Strength 5/5 B/L upper and lower extremities; DTRs 2+ intact and symmetric  CHEST/LUNG: Clear to percussion bilaterally; No rales, rhonchi, wheezing, or rubs  HEART: Regular rate and rhythm; No murmurs, rubs, or gallops  ABDOMEN: Soft, Nontender, Nondistended; Bowel sounds present  EXTREMITIES:  2+ Peripheral Pulses, No clubbing, cyanosis, or edema  LYMPH: No lymphadenopathy noted  SKIN: No rashes or lesions    Labs                          9.0    12.90 )-----------( 361      ( 18 Jun 2023 07:10 )             27.7     06-18    140  |  108  |  25<H>  ----------------------------<  109<H>  4.3   |  30  |  1.56<H>    Ca    10.3<H>      18 Jun 2023 07:10  Phos  2.3     06-18  Mg     2.1     06-18                          DVT prophylaxis: > Lovenox 40mg SQ daily  > Heparin   > SCD's
Patient is a 68y old  Female who presents with a chief complaint of Acute cholecystitis (20 Jun 2023 09:59)    INTERVAL HPI/OVERNIGHT EVENTS:    MEDICATIONS  (STANDING):  amLODIPine   Tablet 10 milliGRAM(s) Oral daily  dextrose 5%. 1000 milliLiter(s) (50 mL/Hr) IV Continuous <Continuous>  dextrose 5%. 1000 milliLiter(s) (100 mL/Hr) IV Continuous <Continuous>  dextrose 50% Injectable 25 Gram(s) IV Push once  dextrose 50% Injectable 12.5 Gram(s) IV Push once  dextrose 50% Injectable 25 Gram(s) IV Push once  glucagon  Injectable 1 milliGRAM(s) IntraMuscular once  heparin   Injectable 5000 Unit(s) SubCutaneous every 8 hours  insulin glargine Injectable (LANTUS) 20 Unit(s) SubCutaneous at bedtime  insulin lispro (ADMELOG) corrective regimen sliding scale   SubCutaneous every 6 hours  insulin lispro Injectable (ADMELOG) 6 Unit(s) SubCutaneous three times a day before meals  losartan 50 milliGRAM(s) Oral daily  potassium phosphate / sodium phosphate Powder (PHOS-NaK) 1 Packet(s) Oral once  triamterene 37.5 mG/hydrochlorothiazide 25 mG Tablet 1 Tablet(s) Oral daily    MEDICATIONS  (PRN):  acetaminophen     Tablet .. 975 milliGRAM(s) Oral every 6 hours PRN Mild Pain (1 - 3)  albuterol    90 MICROgram(s) HFA Inhaler 2 Puff(s) Inhalation every 6 hours PRN Shortness of Breath and/or Wheezing  dextrose Oral Gel 15 Gram(s) Oral once PRN Blood Glucose LESS THAN 70 milliGRAM(s)/deciliter  morphine  - Injectable 2 milliGRAM(s) IV Push every 6 hours PRN breakthru pain  ondansetron Injectable 4 milliGRAM(s) IV Push every 6 hours PRN Nausea  oxyCODONE    IR 5 milliGRAM(s) Oral every 6 hours PRN Moderate Pain (4 - 6)    Allergies    No Known Allergies    Intolerances      REVIEW OF SYSTEMS:  All other systems reviewed and are negative    Vital Signs Last 24 Hrs  T(C): 36.9 (20 Jun 2023 16:23), Max: 37.2 (20 Jun 2023 05:13)  T(F): 98.4 (20 Jun 2023 16:23), Max: 99 (20 Jun 2023 05:13)  HR: 83 (20 Jun 2023 16:23) (79 - 96)  BP: 111/63 (20 Jun 2023 16:23) (111/63 - 159/77)  BP(mean): --  RR: 17 (20 Jun 2023 16:23) (16 - 18)  SpO2: 97% (20 Jun 2023 16:23) (95% - 97%)    Parameters below as of 20 Jun 2023 16:23  Patient On (Oxygen Delivery Method): room air      Daily     Daily   I&O's Summary    CAPILLARY BLOOD GLUCOSE      POCT Blood Glucose.: 172 mg/dL (20 Jun 2023 16:31)  POCT Blood Glucose.: 196 mg/dL (20 Jun 2023 11:31)  POCT Blood Glucose.: 144 mg/dL (20 Jun 2023 07:41)  POCT Blood Glucose.: 297 mg/dL (19 Jun 2023 23:58)  POCT Blood Glucose.: 351 mg/dL (19 Jun 2023 21:41)    PHYSICAL EXAM:  GENERAL: NAD,    HEAD:  Atraumatic, Normocephalic  EYES: EOMI, PERRLA, conjunctiva and sclera clear  ENMT: No tonsillar erythema, exudates, or enlargement; Moist mucous membranes, Good dentition, No lesions  NECK: Supple, No JVD, Normal thyroid  NERVOUS SYSTEM:  Alert & Oriented X3, Good concentration; Motor Strength 5/5 B/L upper and lower extremities; DTRs 2+ intact and symmetric  CHEST/LUNG: Clear to percussion bilaterally; No rales, rhonchi, wheezing, or rubs  HEART: Regular rate and rhythm; No murmurs, rubs, or gallops  ABDOMEN: Soft, Nontender, Nondistended; Bowel sounds present  EXTREMITIES:  2+ Peripheral Pulses, No clubbing, cyanosis, or edema  LYMPH: No lymphadenopathy noted  SKIN: No rashes or lesions    Labs                          9.1    13.40 )-----------( 450      ( 20 Jun 2023 14:53 )             28.0     06-20    134<L>  |  105  |  19  ----------------------------<  208<H>  4.4   |  28  |  1.29    Ca    9.7      20 Jun 2023 14:53  Phos  3.9     06-19  Mg     1.9     06-19    TPro  7.1  /  Alb  2.3<L>  /  TBili  0.4  /  DBili  0.2  /  AST  70<H>  /  ALT  119<H>  /  AlkPhos  149<H>  06-20                        DVT prophylaxis: > Lovenox 40mg SQ daily  > Heparin   > SCD's
Patient seen and  examined at bedside resting comfortably, offers no complaints.   Abdominal pain improving since IR drainage on 6/21.  Tolerating diet, with bowel function.   Denies fever, chills, N/V/D, CP, SOB.    Vital Signs Last 24 Hrs  T(F): 97.6 (06-23-23 @ 05:14), Max: 98 (06-22-23 @ 17:34)  HR: 71 (06-23-23 @ 05:14)  BP: 119/76 (06-23-23 @ 05:14)  RR: 18 (06-23-23 @ 05:14)  SpO2: 95% (06-23-23 @ 05:14)  POCT Blood Glucose.: 271 mg/dL (22 Jun 2023 21:09)    PHYSICAL EXAM:  GENERAL: Alert, NAD  CHEST/LUNG: Clear to auscultation bilaterally, respirations nonlabored  HEART: Regular rate and rhythm; S1 & S2 appreciated  ABDOMEN: soft, NT/ND. Steri strips in place, c/d/i. IR drain with minimal sanguineous/bilious output  EXTREMITIES:  no calf tenderness, No edema    I&O's Detail    21 Jun 2023 07:01  -  22 Jun 2023 07:00  --------------------------------------------------------  IN:  Total IN: 0 mL    OUT:    Voided (mL): 1150 mL  Total OUT: 1150 mL    Total NET: -1150 mL      22 Jun 2023 07:01  -  23 Jun 2023 06:38  --------------------------------------------------------  IN:    dextrose 5% + sodium chloride 0.45%: 1440 mL    IV PiggyBack: 200 mL    Oral Fluid: 820 mL  Total IN: 2460 mL    OUT:    Drain (mL): 5 mL    Voided (mL): 500 mL  Total OUT: 505 mL    Total NET: 1955 mL          LABS:                        8.7    10.37 )-----------( 502      ( 22 Jun 2023 08:07 )             27.3     06-22    135  |  109<H>  |  24<H>  ----------------------------<  141<H>  4.4   |  25  |  1.27    Ca    10.2<H>      22 Jun 2023 08:07  Phos  3.6     06-22  Mg     1.9     06-22    TPro  7.0  /  Alb  2.4<L>  /  TBili  0.6  /  DBili  x   /  AST  175<H>  /  ALT  265<H>  /  AlkPhos  182<H>  06-22    PT/INR - ( 21 Jun 2023 10:50 )   PT: 12.1 sec;   INR: 1.01 ratio         PTT - ( 21 Jun 2023 10:50 )  PTT:30.3 sec    RADIOLOGY & ADDITIONAL STUDIES:    A/P  68 year old female POD#7 s/p lap shawn   US 6/20: Approximately 6.2 x 2.4 x 3.8 cm mildly complex collection in the surgical bed.   s/p IR drainage of biloma and placement of drain 6/21    - regular diet  - abx  - analgesia prn  - DVT ppx, OOB/AAT, IS  - f/u AM labs   - d/c planning on Augmentin x3 days when VNS set up  - will d/w Dr. Gallardo  
Post-op check    S/P laparoscopic cholecystectomy POD#0  Pt seen and examined at bedside. Admits to incisional pain well controlled with medication. Tolerating clear liquids. Voiding. Ambulating. Denies chest pain, shortness of breath, nausea/ vomiting, and dizziness.     Vital Signs Last 24 Hrs  T(F): 98.2 (06-15-23 @ 23:50), Max: 98.7 (06-15-23 @ 17:31)  HR: 87 (06-15-23 @ 23:50)  BP: 139/76 (06-15-23 @ 23:50)  RR: 18 (06-15-23 @ 23:50)  SpO2: 95% (06-15-23 @ 23:50)  POCT Blood Glucose.: 417 mg/dL (16 Jun 2023 00:19)      CONSTITUTIONAL: Alert, NAD  RESPIRATORY: Clear to auscultation bilaterally, respirations nonlabored  CARDIOVASCULAR: S1S2, Regular rate and rhythm  GASTROINTESTINAL: Steri strips C/D/I, Nondistended, bowel sounds, soft, Appropriate incisional tenderness  MUSCULOSKELETAL: No calf tenderness, No edema      Assessment: 68F S/P laparoscopic cholecystectomy POD#0    Plan:  - local wound care  - DVT prophylaxis, Incentive Spirometer, OOB, Ambulating, pain control  - Continue antibiotics   - f/u labs   
Pre-operative Note    - Pre-operative Diagnosis: acute cholecystitis     - Procedure: laparoscopic cholecystectomy       - Labs:                        10.8   11.49 )-----------( 281      ( 13 Jun 2023 06:10 )             33.4     06-12    136  |  102  |  19  ----------------------------<  291<H>  5.0   |  29  |  1.49<H>    Ca    10.5<H>      12 Jun 2023 23:19    TPro  8.0  /  Alb  3.8  /  TBili  0.6  /  DBili  x   /  AST  8<L>  /  ALT  19  /  AlkPhos  80  06-12    PT/INR - ( 13 Jun 2023 06:10 )   PT: 12.1 sec;   INR: 1.02 ratio       PTT - ( 13 Jun 2023 06:10 )  PTT:31.0 sec    - CXR: ordered  - EKG: ordered      - Orders:  > NPO, IVF  > Zosyn  > Labs: CBC, BMP, coags, type & screen    - Permits:  > Consent in chart.  > Case scheduled with OR.  > Covid negative    
POD 3 s/p laparoscopic cholecystectomy.  Patient seen and examined at bedside with Dr. Hagen.   Complains of sharp midline abdominal pain.   Tolerating regular, low fat, carb consistent diet without nausea or vomiting. Passing flatus.   Has not been ambulating confirmed with PT who will work with patient today.         MEDICATIONS  (STANDING):  acetaminophen     Tablet .. 975 milliGRAM(s) Oral every 6 hours  amLODIPine   Tablet 10 milliGRAM(s) Oral daily  dextrose 5%. 1000 milliLiter(s) (50 mL/Hr) IV Continuous <Continuous>  dextrose 5%. 1000 milliLiter(s) (100 mL/Hr) IV Continuous <Continuous>  dextrose 50% Injectable 25 Gram(s) IV Push once  dextrose 50% Injectable 12.5 Gram(s) IV Push once  dextrose 50% Injectable 25 Gram(s) IV Push once  glucagon  Injectable 1 milliGRAM(s) IntraMuscular once  heparin   Injectable 5000 Unit(s) SubCutaneous every 8 hours  insulin glargine Injectable (LANTUS) 20 Unit(s) SubCutaneous at bedtime  insulin lispro (ADMELOG) corrective regimen sliding scale   SubCutaneous every 6 hours  insulin lispro Injectable (ADMELOG) 6 Unit(s) SubCutaneous three times a day before meals  losartan 50 milliGRAM(s) Oral daily  piperacillin/tazobactam IVPB.. 3.375 Gram(s) IV Intermittent every 8 hours  triamterene 37.5 mG/hydrochlorothiazide 25 mG Tablet 1 Tablet(s) Oral daily    MEDICATIONS  (PRN):  albuterol    90 MICROgram(s) HFA Inhaler 2 Puff(s) Inhalation every 6 hours PRN Shortness of Breath and/or Wheezing  dextrose Oral Gel 15 Gram(s) Oral once PRN Blood Glucose LESS THAN 70 milliGRAM(s)/deciliter  ondansetron Injectable 4 milliGRAM(s) IV Push every 6 hours PRN Nausea  oxyCODONE    IR 5 milliGRAM(s) Oral every 6 hours PRN Moderate Pain (4 - 6)      Vital Signs Last 24 Hrs  T(C): 36.6 (18 Jun 2023 05:04), Max: 36.9 (17 Jun 2023 12:06)  T(F): 97.9 (18 Jun 2023 05:04), Max: 98.4 (17 Jun 2023 12:06)  HR: 76 (18 Jun 2023 05:04) (76 - 85)  BP: 127/74 (18 Jun 2023 05:04) (113/70 - 140/76)  BP(mean): --  RR: 16 (18 Jun 2023 05:04) (16 - 18)  SpO2: 95% (18 Jun 2023 05:04) (94% - 97%)    Parameters below as of 17 Jun 2023 17:18  Patient On (Oxygen Delivery Method): room air      PHYSICAL EXAM:  General: NAD, resting in bed  Neuro:  Alert & oriented x 3  HEENT: NCAT, EOMI, conjunctiva clear  Lung:  respirations nonlabored on room air, good inspiratory effort  Abdomen: soft, nontender, nondistended. No rebound or guarding   Extremities: no pedal edema or calf tenderness noted     I&O's Detail    17 Jun 2023 07:01  -  18 Jun 2023 07:00  --------------------------------------------------------  IN:    Oral Fluid: 480 mL  Total IN: 480 mL    OUT:    Voided (mL): 1500 mL  Total OUT: 1500 mL    Total NET: -1020 mL          LABS:                        9.0    12.90 )-----------( 361      ( 18 Jun 2023 07:10 )             27.7     06-18    140  |  108  |  25<H>  ----------------------------<  109<H>  4.3   |  30  |  1.56<H>    Ca    10.3<H>      18 Jun 2023 07:10  Phos  2.3     06-18  Mg     2.1     06-18              
Surgery NP note  Patient seen and examined bedside resting comfortably.  No complaints offered. Abdominal pain is well controlled.  Denies nausea and vomiting. Tolerating diet.  Normal flatus/BM.     T(F): 97.9 (06-22-23 @ 11:53), Max: 98.4 (06-21-23 @ 23:43)  HR: 74 (06-22-23 @ 11:53) (74 - 84)  BP: 110/67 (06-22-23 @ 11:53) (110/67 - 129/76)  RR: 17 (06-22-23 @ 11:53) (16 - 17)  SpO2: 96% (06-22-23 @ 11:53) (94% - 96%)  Wt(kg): --  CAPILLARY BLOOD GLUCOSE      POCT Blood Glucose.: 140 mg/dL (22 Jun 2023 07:50)  POCT Blood Glucose.: 182 mg/dL (22 Jun 2023 05:53)  POCT Blood Glucose.: 236 mg/dL (22 Jun 2023 00:30)  POCT Blood Glucose.: 218 mg/dL (21 Jun 2023 21:17)  POCT Blood Glucose.: 164 mg/dL (21 Jun 2023 17:47)      PHYSICAL EXAM:  General: NAD, WDWN.   Neuro:  Alert & responsive  HEENT: NCAT, EOMI, conjunctiva clear  CV: +S1+S2 regular rate and rhythm  Lung: clear to ausculation bilaterally, respirations nonlabored, good inspiratory effort  Abdomen: soft, Non tender, No Distension. Normal active BS. IR drain with serosanguinous drainage with small amount of bile  Extremities: no pedal edema or calf tenderness noted     LABS:                        8.7    10.37 )-----------( 502      ( 22 Jun 2023 08:07 )             27.3     06-22    135  |  109<H>  |  24<H>  ----------------------------<  141<H>  4.4   |  25  |  1.27    Ca    10.2<H>      22 Jun 2023 08:07  Phos  3.6     06-22  Mg     1.9     06-22    TPro  7.0  /  Alb  2.4<L>  /  TBili  0.6  /  DBili  x   /  AST  175<H>  /  ALT  265<H>  /  AlkPhos  182<H>  06-22    LIVER FUNCTIONS - ( 22 Jun 2023 08:07 )  Alb: 2.4 g/dL / Pro: 7.0 gm/dL / ALK PHOS: 182 U/L / ALT: 265 U/L / AST: 175 U/L / GGT: x           PT/INR - ( 21 Jun 2023 10:50 )   PT: 12.1 sec;   INR: 1.01 ratio         PTT - ( 21 Jun 2023 10:50 )  PTT:30.3 sec  I&O's Detail    21 Jun 2023 07:01  -  22 Jun 2023 07:00  --------------------------------------------------------  IN:  Total IN: 0 mL    OUT:    Voided (mL): 1150 mL  Total OUT: 1150 mL    Total NET: -1150 mL      22 Jun 2023 07:01  -  22 Jun 2023 13:11  --------------------------------------------------------  IN:    Oral Fluid: 240 mL  Total IN: 240 mL    OUT:    Drain (mL): 5 mL  Total OUT: 5 mL    Total NET: 235 mL      
Patient is a 68y old  Female who presents with a chief complaint of Acute cholecystitis (21 Jun 2023 15:30)      OVERNIGHT EVENTS:  Patients seen and examined at bedside this morning. No acute events overnight.    REVIEW OF SYSTEMS: denies chest pain/SOB, diaphoresis, no F/C, cough, dizziness, headache, blurry vision, nausea, vomiting, abdominal pain. All others review of systems negative     MEDICATIONS  (STANDING):  amLODIPine   Tablet 10 milliGRAM(s) Oral daily  dextrose 5% + sodium chloride 0.45%. 1000 milliLiter(s) (120 mL/Hr) IV Continuous <Continuous>  dextrose 5%. 1000 milliLiter(s) (100 mL/Hr) IV Continuous <Continuous>  dextrose 5%. 1000 milliLiter(s) (50 mL/Hr) IV Continuous <Continuous>  dextrose 50% Injectable 25 Gram(s) IV Push once  dextrose 50% Injectable 12.5 Gram(s) IV Push once  dextrose 50% Injectable 25 Gram(s) IV Push once  glucagon  Injectable 1 milliGRAM(s) IntraMuscular once  insulin glargine Injectable (LANTUS) 20 Unit(s) SubCutaneous at bedtime  insulin lispro (ADMELOG) corrective regimen sliding scale   SubCutaneous every 6 hours  insulin lispro Injectable (ADMELOG) 6 Unit(s) SubCutaneous three times a day before meals  losartan 50 milliGRAM(s) Oral daily  piperacillin/tazobactam IVPB.. 3.375 Gram(s) IV Intermittent every 8 hours  potassium phosphate / sodium phosphate Powder (PHOS-NaK) 1 Packet(s) Oral once  sodium phosphate 15 milliMole(s)/250 mL IVPB 15 milliMole(s) IV Intermittent once  triamterene 37.5 mG/hydrochlorothiazide 25 mG Tablet 1 Tablet(s) Oral daily    MEDICATIONS  (PRN):  acetaminophen     Tablet .. 975 milliGRAM(s) Oral every 6 hours PRN Mild Pain (1 - 3)  albuterol    90 MICROgram(s) HFA Inhaler 2 Puff(s) Inhalation every 6 hours PRN Shortness of Breath and/or Wheezing  dextrose Oral Gel 15 Gram(s) Oral once PRN Blood Glucose LESS THAN 70 milliGRAM(s)/deciliter  morphine  - Injectable 2 milliGRAM(s) IV Push every 6 hours PRN breakthru pain  ondansetron Injectable 4 milliGRAM(s) IV Push every 6 hours PRN Nausea  oxyCODONE    IR 5 milliGRAM(s) Oral every 6 hours PRN Moderate Pain (4 - 6)      Allergies    No Known Allergies    Intolerances        T(F): 98.3 (06-21-23 @ 11:13), Max: 98.4 (06-20-23 @ 16:23)  HR: 85 (06-21-23 @ 11:13) (83 - 85)  BP: 128/79 (06-21-23 @ 11:13) (111/63 - 139/73)  RR: 17 (06-21-23 @ 11:13) (16 - 17)  SpO2: 95% (06-21-23 @ 11:13) (94% - 97%)  Wt(kg): --    PHYSICAL EXAM:  GENERAL: NAD  HEAD:  Atraumatic, Normocephalic  EYES: PERRLA, conjunctiva and sclera clear  ENMT: Moist mucous membranes  NECK: Supple, No JVD, Normal thyroid  NERVOUS SYSTEM:  Alert & Awake  CHEST/LUNG: Clear to percussion bilaterally;   HEART: Regular rate and rhythm;   ABDOMEN: Soft, Nontender, Nondistended; Bowel sounds present  EXTREMITIES:  no edema BL LE  SKIN: moist    LABS:                        8.5    13.30 )-----------( 449      ( 21 Jun 2023 10:50 )             27.4     06-21    135  |  109<H>  |  21  ----------------------------<  241<H>  4.6   |  24  |  1.38<H>    Ca    10.3<H>      21 Jun 2023 10:50  Phos  2.2     06-21  Mg     1.9     06-21    TPro  7.2  /  Alb  2.3<L>  /  TBili  0.6  /  DBili  0.1  /  AST  163<H>  /  ALT  202<H>  /  AlkPhos  158<H>  06-21    PT/INR - ( 21 Jun 2023 10:50 )   PT: 12.1 sec;   INR: 1.01 ratio         PTT - ( 21 Jun 2023 10:50 )  PTT:30.3 sec  Urinalysis Basic - ( 21 Jun 2023 10:50 )    Color: x / Appearance: x / SG: x / pH: x  Gluc: 241 mg/dL / Ketone: x  / Bili: x / Urobili: x   Blood: x / Protein: x / Nitrite: x   Leuk Esterase: x / RBC: x / WBC x   Sq Epi: x / Non Sq Epi: x / Bacteria: x      Cultures;   CAPILLARY BLOOD GLUCOSE      POCT Blood Glucose.: 210 mg/dL (21 Jun 2023 05:59)  POCT Blood Glucose.: 172 mg/dL (21 Jun 2023 00:23)  POCT Blood Glucose.: 196 mg/dL (20 Jun 2023 21:32)  POCT Blood Glucose.: 172 mg/dL (20 Jun 2023 16:31)    Lipid panel:           RADIOLOGY & ADDITIONAL TESTS:    Imaging Personally Reviewed:  [x ] YES      Consultant(s) Notes Reviewed:  [x ] YES     Care Discussed with [x ] Consultants [X ] Patient [ ] Family  [x ]    [x ]  Other; RN
Patient is a 68y old  Female who presents with a chief complaint of Acute cholecystitis (23 Jun 2023 06:38)      OVERNIGHT EVENTS:  Patients seen and examined at bedside this morning. No acute events overnight.    REVIEW OF SYSTEMS: denies chest pain/SOB, diaphoresis, no F/C, cough, dizziness, headache, blurry vision, nausea, vomiting, abdominal pain. All others review of systems negative     MEDICATIONS  (STANDING):  amLODIPine   Tablet 10 milliGRAM(s) Oral daily  dextrose 5% + sodium chloride 0.45%. 1000 milliLiter(s) (120 mL/Hr) IV Continuous <Continuous>  dextrose 5%. 1000 milliLiter(s) (50 mL/Hr) IV Continuous <Continuous>  dextrose 5%. 1000 milliLiter(s) (100 mL/Hr) IV Continuous <Continuous>  dextrose 50% Injectable 25 Gram(s) IV Push once  dextrose 50% Injectable 12.5 Gram(s) IV Push once  dextrose 50% Injectable 25 Gram(s) IV Push once  glucagon  Injectable 1 milliGRAM(s) IntraMuscular once  heparin   Injectable 5000 Unit(s) SubCutaneous every 12 hours  ibuprofen  Tablet. 400 milliGRAM(s) Oral every 6 hours  insulin glargine Injectable (LANTUS) 20 Unit(s) SubCutaneous at bedtime  insulin lispro (ADMELOG) corrective regimen sliding scale   SubCutaneous every 6 hours  insulin lispro Injectable (ADMELOG) 6 Unit(s) SubCutaneous three times a day before meals  losartan 50 milliGRAM(s) Oral daily  piperacillin/tazobactam IVPB.. 3.375 Gram(s) IV Intermittent every 8 hours  potassium phosphate / sodium phosphate Powder (PHOS-NaK) 1 Packet(s) Oral once  triamterene 37.5 mG/hydrochlorothiazide 25 mG Tablet 1 Tablet(s) Oral daily    MEDICATIONS  (PRN):  acetaminophen   IVPB .. 1000 milliGRAM(s) IV Intermittent once PRN Mild Pain (1 - 3)  albuterol    90 MICROgram(s) HFA Inhaler 2 Puff(s) Inhalation every 6 hours PRN Shortness of Breath and/or Wheezing  dextrose Oral Gel 15 Gram(s) Oral once PRN Blood Glucose LESS THAN 70 milliGRAM(s)/deciliter  ondansetron Injectable 4 milliGRAM(s) IV Push every 6 hours PRN Nausea      Allergies    No Known Allergies    Intolerances        T(F): 97.6 (06-23-23 @ 05:14), Max: 98 (06-22-23 @ 17:34)  HR: 71 (06-23-23 @ 05:14) (71 - 84)  BP: 119/76 (06-23-23 @ 05:14) (109/68 - 126/62)  RR: 18 (06-23-23 @ 05:14) (16 - 18)  SpO2: 95% (06-23-23 @ 05:14) (95% - 96%)  Wt(kg): --    PHYSICAL EXAM:  GENERAL: NAD  HEAD:  Atraumatic, Normocephalic  EYES: PERRLA, conjunctiva and sclera clear  ENMT: Moist mucous membranes  NECK: Supple, No JVD, Normal thyroid  NERVOUS SYSTEM:  Alert & Awake  CHEST/LUNG: Clear to percussion bilaterally;   HEART: Regular rate and rhythm;   ABDOMEN: Soft, Nontender, Nondistended; Bowel sounds present  EXTREMITIES:  no edema BL LE  SKIN: moist    LABS:                        8.8    10.28 )-----------( 532      ( 23 Jun 2023 10:10 )             28.3     06-22    135  |  109<H>  |  24<H>  ----------------------------<  141<H>  4.4   |  25  |  1.27    Ca    10.2<H>      22 Jun 2023 08:07  Phos  3.6     06-22  Mg     1.9     06-22    TPro  7.0  /  Alb  2.4<L>  /  TBili  0.6  /  DBili  x   /  AST  175<H>  /  ALT  265<H>  /  AlkPhos  182<H>  06-22    PT/INR - ( 21 Jun 2023 10:50 )   PT: 12.1 sec;   INR: 1.01 ratio         PTT - ( 21 Jun 2023 10:50 )  PTT:30.3 sec  Urinalysis Basic - ( 22 Jun 2023 08:07 )    Color: x / Appearance: x / SG: x / pH: x  Gluc: 141 mg/dL / Ketone: x  / Bili: x / Urobili: x   Blood: x / Protein: x / Nitrite: x   Leuk Esterase: x / RBC: x / WBC x   Sq Epi: x / Non Sq Epi: x / Bacteria: x      Cultures;   CAPILLARY BLOOD GLUCOSE      POCT Blood Glucose.: 292 mg/dL (23 Jun 2023 07:34)  POCT Blood Glucose.: 271 mg/dL (22 Jun 2023 21:09)  POCT Blood Glucose.: 356 mg/dL (22 Jun 2023 16:36)    Lipid panel:           RADIOLOGY & ADDITIONAL TESTS:    Imaging Personally Reviewed:  [x ] YES      Consultant(s) Notes Reviewed:  [x ] YES     Care Discussed with [x ] Consultants [X ] Patient [ ] Family  [x ]    [x ]  Other; RN
Patient is a 68y old  Female who presents with a chief complaint of Acute cholecystitis (22 Jun 2023 13:11)      OVERNIGHT EVENTS:  Patients seen and examined at bedside this morning. No acute events overnight.    REVIEW OF SYSTEMS: denies chest pain/SOB, diaphoresis, no F/C, cough, dizziness, headache, blurry vision, nausea, vomiting, abdominal pain. All others review of systems negative     MEDICATIONS  (STANDING):  amLODIPine   Tablet 10 milliGRAM(s) Oral daily  dextrose 5% + sodium chloride 0.45%. 1000 milliLiter(s) (120 mL/Hr) IV Continuous <Continuous>  dextrose 5%. 1000 milliLiter(s) (100 mL/Hr) IV Continuous <Continuous>  dextrose 5%. 1000 milliLiter(s) (50 mL/Hr) IV Continuous <Continuous>  dextrose 50% Injectable 25 Gram(s) IV Push once  dextrose 50% Injectable 25 Gram(s) IV Push once  dextrose 50% Injectable 12.5 Gram(s) IV Push once  glucagon  Injectable 1 milliGRAM(s) IntraMuscular once  heparin   Injectable 5000 Unit(s) SubCutaneous every 12 hours  ibuprofen  Tablet. 400 milliGRAM(s) Oral every 6 hours  insulin glargine Injectable (LANTUS) 20 Unit(s) SubCutaneous at bedtime  insulin lispro (ADMELOG) corrective regimen sliding scale   SubCutaneous every 6 hours  insulin lispro Injectable (ADMELOG) 6 Unit(s) SubCutaneous three times a day before meals  losartan 50 milliGRAM(s) Oral daily  piperacillin/tazobactam IVPB.. 3.375 Gram(s) IV Intermittent every 8 hours  potassium phosphate / sodium phosphate Powder (PHOS-NaK) 1 Packet(s) Oral once  triamterene 37.5 mG/hydrochlorothiazide 25 mG Tablet 1 Tablet(s) Oral daily    MEDICATIONS  (PRN):  albuterol    90 MICROgram(s) HFA Inhaler 2 Puff(s) Inhalation every 6 hours PRN Shortness of Breath and/or Wheezing  dextrose Oral Gel 15 Gram(s) Oral once PRN Blood Glucose LESS THAN 70 milliGRAM(s)/deciliter  ondansetron Injectable 4 milliGRAM(s) IV Push every 6 hours PRN Nausea  oxyCODONE    IR 5 milliGRAM(s) Oral every 6 hours PRN Moderate Pain (4 - 6)      Allergies    No Known Allergies    Intolerances        T(F): 97.9 (06-22-23 @ 11:53), Max: 98.4 (06-21-23 @ 23:43)  HR: 74 (06-22-23 @ 11:53) (74 - 84)  BP: 110/67 (06-22-23 @ 11:53) (110/67 - 129/76)  RR: 17 (06-22-23 @ 11:53) (16 - 17)  SpO2: 96% (06-22-23 @ 11:53) (94% - 96%)  Wt(kg): --    PHYSICAL EXAM:  GENERAL: NAD  HEAD:  Atraumatic, Normocephalic  EYES: PERRLA, conjunctiva and sclera clear  ENMT: Moist mucous membranes  NECK: Supple, No JVD, Normal thyroid  NERVOUS SYSTEM:  Alert & Awake  CHEST/LUNG: Clear to percussion bilaterally;   HEART: Regular rate and rhythm;   ABDOMEN: Soft, Nontender, Nondistended; Bowel sounds present  EXTREMITIES:  no edema BL LE  SKIN: moist    LABS:                        8.7    10.37 )-----------( 502      ( 22 Jun 2023 08:07 )             27.3     06-22    135  |  109<H>  |  24<H>  ----------------------------<  141<H>  4.4   |  25  |  1.27    Ca    10.2<H>      22 Jun 2023 08:07  Phos  3.6     06-22  Mg     1.9     06-22    TPro  7.0  /  Alb  2.4<L>  /  TBili  0.6  /  DBili  x   /  AST  175<H>  /  ALT  265<H>  /  AlkPhos  182<H>  06-22    PT/INR - ( 21 Jun 2023 10:50 )   PT: 12.1 sec;   INR: 1.01 ratio         PTT - ( 21 Jun 2023 10:50 )  PTT:30.3 sec  Urinalysis Basic - ( 22 Jun 2023 08:07 )    Color: x / Appearance: x / SG: x / pH: x  Gluc: 141 mg/dL / Ketone: x  / Bili: x / Urobili: x   Blood: x / Protein: x / Nitrite: x   Leuk Esterase: x / RBC: x / WBC x   Sq Epi: x / Non Sq Epi: x / Bacteria: x      Cultures;   CAPILLARY BLOOD GLUCOSE      POCT Blood Glucose.: 140 mg/dL (22 Jun 2023 07:50)  POCT Blood Glucose.: 182 mg/dL (22 Jun 2023 05:53)  POCT Blood Glucose.: 236 mg/dL (22 Jun 2023 00:30)  POCT Blood Glucose.: 218 mg/dL (21 Jun 2023 21:17)  POCT Blood Glucose.: 164 mg/dL (21 Jun 2023 17:47)    Lipid panel:           RADIOLOGY & ADDITIONAL TESTS:    Imaging Personally Reviewed:  [x ] YES      Consultant(s) Notes Reviewed:  [x ] YES     Care Discussed with [x ] Consultants [X ] Patient [ ] Family  [x ]    [x ]  Other; RN

## 2023-06-23 NOTE — PROGRESS NOTE ADULT - TIME BILLING
Patient encounter, exam F-2-F, medical decision making , chart review and documentation

## 2023-06-23 NOTE — PROGRESS NOTE ADULT - ASSESSMENT
68 year old female PMHx HTN, DM, asthma, s/p 3 x c-sections, now POD 3 s/p laparscopic choleycetsctomy. Afebrile, vitals stable, leukocytosis downtrending 15.2->12.9, creatinine continues to downtrend Cr 1.88->1.72->1.56 this AM, blood sugars improving with increased lantus, glucose this AM 89 (compared to yesterday AM glucose 311).     - continue Zosyn  - continue diet  - continue lantus 20  - phosphorous repleted   - local wound care, DVT ppx w/ heparin, incentive spirometer   - pain control with acetaminophen ATC and oxycodone PRN  - PT to work with patient today  - discussed and seen with Dr. Hagne 
68 year old female POD #4 s/p lap shawn     instructed on proper use of incentive spirometry with feedback on performance   continue to use ICS q1h while awake  pain control  mobilize  dc planning home today if home PT can be coordinated   LUDWIG creatinine improving  leukocytosis improving.     
68F PMHx HTN, asthma, DM,  x 3, palpitations c/o RUQ pain since yesterday. Admitted with acute cholecystitis, LUDWIG.         Acute cholecystitis.   ·  Plan: -management per surgical team   -POD1  laparoscopic cholecystectomy,    -IV antibiotics, HIDA scan Positive   -tolerating diet         CKD 3  ·  Plan: IVF, monitor cr, No LUDWIG patient at baseline      DM  -resume insulin     HTN  -c/w home meds     
68F PMHx HTN, asthma, DM,  x 3, palpitations c/o RUQ pain since yesterday. Admitted with acute cholecystitis, LUDWIG.         Acute cholecystitis.   ·  Plan: -management per surgical team   -POD3  laparoscopic cholecystectomy,    -IV antibiotics, HIDA scan Positive   -tolerating diet         CKD 3  ·  Plan: IVF, monitor cr, No LUDWIG patient at baseline      DM  -resume insulin , hyperglycemia, insulin adjusted     HTN  -c/w home meds     
68F PMHx HTN, asthma, DM,  x 3, palpitations c/o RUQ pain since yesterday. Admitted with acute cholecystitis, LUDWIG.     Acute cholecystitis.   ·  Plan: -management per surgical team   -POD5 laparoscopic cholecystectomy,    - HIDA scan Positive   - d/c planning on Augmentin x3 days when VNS set up  -tolerating diet     CKD 3  ·  Plan:  monitor cr, No LUDWIG patient at baseline      DM  -resume insulin , hyperglycemia, insulin adjusted     HTN  -c/w home meds     
68 year old female POD#7 s/p lap shawn   US 6/20: Approximately 6.2 x 2.4 x 3.8 cm mildly complex collection in the surgical bed.   s/p IR drainage of biloma and placement of drain.     - discharge home in am with Augmentin for 3 more days , Case management for VNS services for drain  - analgesia prn  - DVT ppx, OOB/AAT, IS  Discussed with Dr Gallardo
68F PMHx HTN, asthma, DM,  x 3, palpitations c/o RUQ pain since yesterday. Admitted with acute cholecystitis, LUDWIG.         Acute cholecystitis.   ·  Plan: -management per surgical team   -OR planning for laparoscopic cholecystectomy, possible open   -IV antibiotics, HIDA scan Positive   -Patient is Class 2 risk for planned surgery, no further medical work up necessary, patient may proceed to the OR  -NPO, IVF  -Dvt ppx        CKD 3  ·  Plan: IVF, monitor cr, No LUDWIG patient at baseline      DM  -sliding scale while NPO    HTN  -c/w home meds     
68F PMHx HTN, asthma, DM,  x 3, palpitations c/o RUQ pain since yesterday. Admitted with acute cholecystitis, LUDWIG.         Acute cholecystitis.   ·  Plan: -management per surgical team   -POD5 laparoscopic cholecystectomy,    -IV antibiotics, HIDA scan Positive   -tolerating diet         CKD 3  ·  Plan: IVF, monitor cr, No LUDWIG patient at baseline      DM  -resume insulin , hyperglycemia, insulin adjusted     HTN  -c/w home meds     
68F PMHx HTN, asthma, DM,  x 3, palpitations c/o RUQ pain since yesterday. Admitted with acute cholecystitis, LUDWIG.         Acute cholecystitis.   ·  Plan: -management per surgical team   -POD2  laparoscopic cholecystectomy,    -IV antibiotics, HIDA scan Positive   -tolerating diet         CKD 3  ·  Plan: IVF, monitor cr, No LUDWIG patient at baseline      DM  -resume insulin , hyperglycemia, insulin adjusted     HTN  -c/w home meds     
68F with Cholecystitis  PMHx HTN, asthma, DM,  x 3, palpitations     - Continue antibiotics  - Hida scan  - Medical consult for evaluation and possible clearance fro Laparoscopic Cholecystectomy   - IV hydration  - EKG  - Discussed with Dr Gonzalez
68 year old fmelad POD #2 s/p lap shawn     continue IV zosyn  Carb consistent diet with RISS, will increase lantus   LUDWIG- gentle hydration cr improving  PT eval  incentive spirometry  scd and sqh  dc planning pending PT eval rec
68F PMHx HTN, asthma, DM,  x 3, palpitations c/o RUQ pain since yesterday. Admitted with acute cholecystitis, LUDWIG.     Acute cholecystitis.   ·  Plan: -management per surgical team   -POD5 laparoscopic cholecystectomy,    -IV antibiotics, HIDA scan Positive   -tolerating diet     CKD 3  ·  Plan: IVF, monitor cr, No LUDWIG patient at baseline      DM  -resume insulin , hyperglycemia, insulin adjusted     HTN  -c/w home meds     
68F PMHx HTN, asthma, DM,  x 3, palpitations c/o RUQ pain since yesterday. Admitted with acute cholecystitis, LUDWIG.     Acute cholecystitis.   ·  Plan: -management per surgical team   -POD5 laparoscopic cholecystectomy,    -IV antibiotics for total 4 days, HIDA scan Positive   -tolerating diet     CKD 3  ·  Plan: IVF, monitor cr, No LUDWIG patient at baseline      DM  -resume insulin , hyperglycemia, insulin adjusted     HTN  -c/w home meds

## 2023-06-23 NOTE — PROGRESS NOTE ADULT - PROVIDER SPECIALTY LIST ADULT
Hospitalist
Surgery
Hospitalist
Intervent Radiology
Surgery
Hospitalist
Surgery
Hospitalist
Hospitalist

## 2023-06-24 ENCOUNTER — TRANSCRIPTION ENCOUNTER (OUTPATIENT)
Age: 68
End: 2023-06-24

## 2023-06-26 ENCOUNTER — TRANSCRIPTION ENCOUNTER (OUTPATIENT)
Age: 68
End: 2023-06-26

## 2023-06-26 ENCOUNTER — APPOINTMENT (OUTPATIENT)
Dept: SURGERY | Facility: CLINIC | Age: 68
End: 2023-06-26
Payer: MEDICARE

## 2023-06-26 VITALS
BODY MASS INDEX: 30.12 KG/M2 | TEMPERATURE: 98.5 F | WEIGHT: 170 LBS | SYSTOLIC BLOOD PRESSURE: 131 MMHG | HEIGHT: 63 IN | DIASTOLIC BLOOD PRESSURE: 74 MMHG | HEART RATE: 85 BPM | OXYGEN SATURATION: 95 %

## 2023-06-26 DIAGNOSIS — K80.20 CALCULUS OF GALLBLADDER W/OUT CHOLECYSTITIS W/OUT OBSTRUCTION: ICD-10-CM

## 2023-06-26 LAB
CULTURE RESULTS: SIGNIFICANT CHANGE UP
GRAM STN FLD: SIGNIFICANT CHANGE UP
SPECIMEN SOURCE: SIGNIFICANT CHANGE UP

## 2023-06-26 PROCEDURE — 99024 POSTOP FOLLOW-UP VISIT: CPT

## 2023-06-26 NOTE — HISTORY OF PRESENT ILLNESS
[de-identified] : [facility] a 68 year-[facility] woman who was treated at [facility] from 6/13/23 to 6/23/23 for acute cholecystitis. She underwent a laparoscopic cholecystectomy on 6/15/23. Gangrenous cholecystitis was noted. Her post-operative course was complicated by a small bile leak requiring IR drain placement on 6/21/23.  [de-identified] : She had presented to [facility] ED two days ago for a clogged tube. Her pain has improved and she no longer requires pain medications. Feels bloated right after eating still. No fevers, chills, chest pain, or SOB.

## 2023-06-26 NOTE — PHYSICAL EXAM
[Respiratory Effort] : normal respiratory effort [de-identified] : Comfortable.  [de-identified] : Steristrips intact. RUQ drain with scant, mj output. No TTP or distention. Drain flushed with 10cc of normal saline.

## 2023-06-29 DIAGNOSIS — K91.89 OTHER POSTPROCEDURAL COMPLICATIONS AND DISORDERS OF DIGESTIVE SYSTEM: ICD-10-CM
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
## 2023-06-29 DIAGNOSIS — Z87.891 PERSONAL HISTORY OF NICOTINE DEPENDENCE: ICD-10-CM

## 2023-06-29 DIAGNOSIS — E11.22 TYPE 2 DIABETES MELLITUS WITH DIABETIC CHRONIC KIDNEY DISEASE: ICD-10-CM

## 2023-06-29 DIAGNOSIS — K83.8 OTHER SPECIFIED DISEASES OF BILIARY TRACT: ICD-10-CM

## 2023-06-29 DIAGNOSIS — K81.0 ACUTE CHOLECYSTITIS: ICD-10-CM

## 2023-06-29 DIAGNOSIS — N17.9 ACUTE KIDNEY FAILURE, UNSPECIFIED: ICD-10-CM

## 2023-06-29 DIAGNOSIS — N18.30 CHRONIC KIDNEY DISEASE, STAGE 3 UNSPECIFIED: ICD-10-CM

## 2023-06-29 DIAGNOSIS — Z79.4 LONG TERM (CURRENT) USE OF INSULIN: ICD-10-CM

## 2023-06-29 DIAGNOSIS — K81.2 ACUTE CHOLECYSTITIS WITH CHRONIC CHOLECYSTITIS: ICD-10-CM
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
## 2023-06-29 DIAGNOSIS — I12.9 HYPERTENSIVE CHRONIC KIDNEY DISEASE WITH STAGE 1 THROUGH STAGE 4 CHRONIC KIDNEY DISEASE, OR UNSPECIFIED CHRONIC KIDNEY DISEASE: ICD-10-CM

## 2023-06-30 ENCOUNTER — APPOINTMENT (OUTPATIENT)
Dept: SURGERY | Facility: CLINIC | Age: 68
End: 2023-06-30
Payer: MEDICARE

## 2023-06-30 VITALS
BODY MASS INDEX: 30.12 KG/M2 | OXYGEN SATURATION: 99 % | SYSTOLIC BLOOD PRESSURE: 127 MMHG | DIASTOLIC BLOOD PRESSURE: 77 MMHG | WEIGHT: 170 LBS | HEIGHT: 63 IN | TEMPERATURE: 98.7 F | HEART RATE: 82 BPM

## 2023-06-30 DIAGNOSIS — Z09 ENCOUNTER FOR FOLLOW-UP EXAMINATION AFTER COMPLETED TREATMENT FOR CONDITIONS OTHER THAN MALIGNANT NEOPLASM: ICD-10-CM

## 2023-06-30 PROCEDURE — 99024 POSTOP FOLLOW-UP VISIT: CPT

## 2023-06-30 NOTE — PHYSICAL EXAM
[Respiratory Effort] : normal respiratory effort [de-identified] : Comfortable.  [de-identified] : Steristrips removed. RUQ drain with scant, saline output. No TTP or distention.

## 2023-06-30 NOTE — HISTORY OF PRESENT ILLNESS
[de-identified] : [facility] 68 [facility] woman who was treated at [facility] from 6/13/23 to 6/23/23 for acute cholecystitis. She underwent a laparoscopic cholecystectomy on 6/15/23. Gangrenous cholecystitis was noted. Her post-operative course was complicated by a small bile leak requiring IR drain placement on 6/21/23.  [de-identified] : Since the last office visit, she has felt overall well. Did have to take some Tylenol today for pain at the catheter site. Tolerating her usual diet. Bowel movement frequency returning slowly to her baseline with Metamucil intake. No fevers or chills.

## 2023-07-05 ENCOUNTER — RESULT REVIEW (OUTPATIENT)
Age: 68
End: 2023-07-05

## 2023-07-05 ENCOUNTER — TRANSCRIPTION ENCOUNTER (OUTPATIENT)
Age: 68
End: 2023-07-05

## 2023-07-05 ENCOUNTER — APPOINTMENT (OUTPATIENT)
Dept: INTERVENTIONAL RADIOLOGY/VASCULAR | Facility: HOSPITAL | Age: 68
End: 2023-07-05

## 2023-07-05 ENCOUNTER — OUTPATIENT (OUTPATIENT)
Dept: OUTPATIENT SERVICES | Facility: HOSPITAL | Age: 68
LOS: 1 days | Discharge: HOME | End: 2023-07-05
Payer: MEDICARE

## 2023-07-05 ENCOUNTER — APPOINTMENT (OUTPATIENT)
Dept: CT IMAGING | Facility: HOSPITAL | Age: 68
End: 2023-07-05

## 2023-07-05 DIAGNOSIS — K65.1 PERITONEAL ABSCESS: ICD-10-CM

## 2023-07-05 DIAGNOSIS — Z98.89 OTHER SPECIFIED POSTPROCEDURAL STATES: Chronic | ICD-10-CM

## 2023-07-05 PROCEDURE — 74176 CT ABD & PELVIS W/O CONTRAST: CPT | Mod: 26

## 2023-07-05 PROCEDURE — 49424 ASSESS CYST CONTRAST INJECT: CPT

## 2023-07-05 PROCEDURE — 76080 X-RAY EXAM OF FISTULA: CPT | Mod: 26

## 2023-07-05 NOTE — PRE PROCEDURE NOTE - PRE PROCEDURE EVALUATION
Interventional Radiology    HPI: 68F PMHx HTN, asthma, DM,  x 3, palpitations c/o RUQ pain since yesterday. Admitted with acute cholecystitis, LUDWIG.  s/p lap shawn on 6/15. US : Approximately 6.2 x 2.4 x 3.8 cm mildly complex collection in the surgical bed. CT : 9.8 x 2.7 x 4.4 cm fluid collection in the operative bed may represent a liquefied hematoma, seroma, abscess or biloma. Pt most recently s/p biloma drainage on  in Interventional Radiology with Dr. Goel. Presents for drain evaluation. Pt reports no output from the drain x several days.       Allergies: No Known Allergies    Medications (Abx/Cardiac/Anticoagulation/Blood Products)      Data:    T(C): --  HR: --  BP: --  RR: --  SpO2: --    Exam  General: No acute distress  Chest: Non labored breathing  Abdomen: Non-distended  Extremities: No swelling, warm          Imaging: Reviewed    Plan: 68y Female presents for biloma drain removal  -Risks/Benefits/alternatives explained with the patient and/or healthcare proxy and witnessed informed consent obtained.

## 2023-07-05 NOTE — PROCEDURE NOTE - PROCEDURE FINDINGS AND DETAILS
Resolution of abdominal fluid collection. No fistula.   Drain was removed.
Contrast injection demonstrated collapsed amorphous cavity surrounding pigtail catheter. Drain removed. No complications. Full report to follow.

## 2023-07-05 NOTE — ASU DISCHARGE PLAN (ADULT/PEDIATRIC) - ASU DC SPECIAL INSTRUCTIONSFT
Drain Removed    Discharge Instructions  - You have had a drain removed.  - Keep the area clean and dry.  - You may notice some leaking into the dressing. Change the dressing as needed.   - If you start to experience the same symptoms again (abdominal pain/swelling, fevers, chills, nausea, etc.), please contact your surgeon or go to the Emergency Room.   - You may resume your normal diet.  - You may resume your normal medications.  - It is normal to experience some pain over the site for the next few days. You may take apply ice to the area (20 minutes on, 20 minutes off) and take Tylenol for that pain. Do not take more frequently than every 6 hours and do not exceed more than 3000mg of Tylenol in a 24 hour period.    Notify your primary physician and/or Interventional Radiology IMMEDIATELY if you experience any of the following       - Fever of 100.4F  or 38C       - Chills or Rigors/ Shakes       - Swelling and/or Redness in the area of the puncture site       - Worsening Pain       - Blood soaked bandages or worsening bleeding       - Lightheadedness and/or dizziness upon standing       - Chest Pain/ Tightness       - Shortness of Breath       - Difficulty walking    If you have a problem that you believe requires IMMEDIATE attention, please go to your NEAREST Emergency Room. If you believe your problem can safely wait until you speak to a physician, please call Interventional Radiology for any concerns.    During Normal Weekday Business Hours- You can contact the Interventional Radiology department during normal business hours via telephone.  During Evenings and Weekends- If you need to contact Interventional Radiology during off hours, do so by calling the hospital and requesting to be connected to the Interventional Radiologist on call.

## 2023-07-10 LAB — GLUCOSE BLDC GLUCOMTR-MCNC: 237 MG/DL — HIGH (ref 70–99)

## 2023-07-17 ENCOUNTER — APPOINTMENT (OUTPATIENT)
Dept: SURGERY | Facility: CLINIC | Age: 68
End: 2023-07-17
Payer: MEDICARE

## 2023-07-17 VITALS
HEART RATE: 88 BPM | HEIGHT: 65 IN | SYSTOLIC BLOOD PRESSURE: 126 MMHG | WEIGHT: 165 LBS | BODY MASS INDEX: 27.49 KG/M2 | TEMPERATURE: 98.3 F | OXYGEN SATURATION: 97 % | DIASTOLIC BLOOD PRESSURE: 77 MMHG

## 2023-07-17 PROCEDURE — 99024 POSTOP FOLLOW-UP VISIT: CPT

## 2023-07-28 NOTE — PLAN
[FreeTextEntry1] : 68 year old woman recovering well after cholecystectomy, and IR drain removal\par - follow up as needed

## 2023-07-28 NOTE — HISTORY OF PRESENT ILLNESS
[de-identified] : 68 year old woman s/p laparoscopic cholecystectomy on 6/15/2023 for a acute gangrenous cholecystitis. Need IR placement of drain for collection, which has now been removed.  [de-identified] : Patient presents to the office for a follow up visit.\par Denies any nausea, vomiting, fever or chills. \par Tolerating PO intake with normal GI and  function.\par Ambulating without issues.\par Has some discomfort where the drain previous was, but requiring any pain medicaitons. \par \par On exam: awake, alert \par No scleral icterus\par Breathing comfortably on room air\par Abd is soft, not distended, and not tender\par Incisions are clean, dry and intact without erythema\par \par

## 2023-08-01 ENCOUNTER — APPOINTMENT (OUTPATIENT)
Dept: CARDIOLOGY | Facility: CLINIC | Age: 68
End: 2023-08-01

## 2023-08-23 ENCOUNTER — NON-APPOINTMENT (OUTPATIENT)
Age: 68
End: 2023-08-23

## 2023-09-25 ENCOUNTER — NON-APPOINTMENT (OUTPATIENT)
Age: 68
End: 2023-09-25

## 2023-09-25 ENCOUNTER — APPOINTMENT (OUTPATIENT)
Dept: CARDIOLOGY | Facility: CLINIC | Age: 68
End: 2023-09-25
Payer: MEDICARE

## 2023-09-25 VITALS
BODY MASS INDEX: 30.3 KG/M2 | HEART RATE: 85 BPM | HEIGHT: 63 IN | OXYGEN SATURATION: 95 % | WEIGHT: 171 LBS | SYSTOLIC BLOOD PRESSURE: 140 MMHG | DIASTOLIC BLOOD PRESSURE: 80 MMHG

## 2023-09-25 DIAGNOSIS — N18.9 CHRONIC KIDNEY DISEASE, UNSPECIFIED: ICD-10-CM

## 2023-09-25 DIAGNOSIS — Z79.4 TYPE 2 DIABETES MELLITUS W/OUT COMPLICATIONS: ICD-10-CM

## 2023-09-25 DIAGNOSIS — E11.9 TYPE 2 DIABETES MELLITUS W/OUT COMPLICATIONS: ICD-10-CM

## 2023-09-25 PROCEDURE — 99214 OFFICE O/P EST MOD 30 MIN: CPT

## 2023-09-25 RX ORDER — TRIAMTERENE AND HYDROCHLOROTHIAZIDE 37.5; 25 MG/1; MG/1
37.5-25 CAPSULE ORAL
Refills: 0 | Status: DISCONTINUED | COMMUNITY
Start: 2022-10-28 | End: 2023-09-25

## 2023-10-13 ENCOUNTER — APPOINTMENT (OUTPATIENT)
Dept: ORTHOPEDIC SURGERY | Facility: CLINIC | Age: 68
End: 2023-10-13

## 2024-01-04 ENCOUNTER — APPOINTMENT (OUTPATIENT)
Age: 69
End: 2024-01-04
Payer: MEDICARE

## 2024-01-04 ENCOUNTER — NON-APPOINTMENT (OUTPATIENT)
Age: 69
End: 2024-01-04

## 2024-01-04 ENCOUNTER — APPOINTMENT (OUTPATIENT)
Dept: CARDIOLOGY | Facility: CLINIC | Age: 69
End: 2024-01-04
Payer: MEDICARE

## 2024-01-04 ENCOUNTER — LABORATORY RESULT (OUTPATIENT)
Age: 69
End: 2024-01-04

## 2024-01-04 VITALS
OXYGEN SATURATION: 98 % | HEART RATE: 86 BPM | SYSTOLIC BLOOD PRESSURE: 120 MMHG | DIASTOLIC BLOOD PRESSURE: 70 MMHG | BODY MASS INDEX: 29.77 KG/M2 | WEIGHT: 168 LBS | HEIGHT: 63 IN

## 2024-01-04 DIAGNOSIS — R00.2 PALPITATIONS: ICD-10-CM

## 2024-01-04 PROCEDURE — 93000 ELECTROCARDIOGRAM COMPLETE: CPT

## 2024-01-04 PROCEDURE — XXXXX: CPT | Mod: 1L

## 2024-01-04 PROCEDURE — 99214 OFFICE O/P EST MOD 30 MIN: CPT | Mod: 25

## 2024-01-04 RX ORDER — LOSARTAN POTASSIUM 100 MG/1
100 TABLET, FILM COATED ORAL DAILY
Refills: 0 | Status: ACTIVE | COMMUNITY
Start: 2022-09-01

## 2024-01-04 NOTE — HISTORY OF PRESENT ILLNESS
[FreeTextEntry1] : 68 years of age with BMI of 30, asthma, type 2 diabetes, degenerative joint disease, abnormal ECG and intermittent palpitations of late.  She is eating generally healthy and taking medications as prescribed.  1/4/23: feeling well still has some palpitations but not as frequent. Holter: 7b SVT; no other arrhythmias

## 2024-01-04 NOTE — DISCUSSION/SUMMARY
[FreeTextEntry1] : 68 years of age with BMI of 30, asthma, type 2 diabetes, degenerative joint disease, abnormal ECG and intermittent palpitations of late.  She is eating generally healthy and taking medications as prescribed.  1/4/23: feeling well still has some palpitations but not as frequent. Holter: 7b SVT; no other arrhythmias   -labs including TSH/lytes -5d Zio -plain exercise stress test -discussed metoprolol .... pt will consider will continue amlodipine for now [EKG obtained to assist in diagnosis and management of assessed problem(s)] : EKG obtained to assist in diagnosis and management of assessed problem(s)

## 2024-01-04 NOTE — CARDIOLOGY SUMMARY
[de-identified] : Sinus  Rhythm  - occasional ectopic ventricular beat   \par  Low voltage in precordial leads. \par   -  Diffuse nonspecific T-abnormality. \par  \par  ABNORMAL \par

## 2024-01-05 LAB
ALBUMIN SERPL ELPH-MCNC: 4.3 G/DL
ALP BLD-CCNC: 87 U/L
ALT SERPL-CCNC: 13 U/L
ANION GAP SERPL CALC-SCNC: 13 MMOL/L
AST SERPL-CCNC: 14 U/L
BILIRUB SERPL-MCNC: 0.4 MG/DL
BUN SERPL-MCNC: 17 MG/DL
CALCIUM SERPL-MCNC: 10.2 MG/DL
CHLORIDE SERPL-SCNC: 105 MMOL/L
CO2 SERPL-SCNC: 23 MMOL/L
CREAT SERPL-MCNC: 1.24 MG/DL
EGFR: 47 ML/MIN/1.73M2
GLUCOSE SERPL-MCNC: 94 MG/DL
POTASSIUM SERPL-SCNC: 4.6 MMOL/L
PROT SERPL-MCNC: 7.2 G/DL
SODIUM SERPL-SCNC: 141 MMOL/L
TSH SERPL-ACNC: 1.51 UIU/ML

## 2024-01-16 NOTE — PROCEDURE NOTE - CONTRAST
CC:  Nusrat Xavier is here today for Office Visit ([facility] subsequent )       Medications have been reviewed and updated    Patient preferred phone number: [phone]  Prefers communication and results via [employer] Maintenance Due   Topic Date Due    Medicare Advantage- Medicare Wellness Visit  01/01/2024       Patient is up to date, no discussion needed.    1.) Do you have an Advance directive, living will, or power of  for health care document that contains your wishes for end of life care?: No     3.) During the past 4 weeks, how would you rate your health?: Fair     4.) During the past 4 weeks, what was the hardest physical activity you could do for at least 2 minutes?: Very Light     5.) Do you do moderate to strenuous exercise (brisk walk) for about 20 minutes for 3 or more days per week?: No, I usually do not exercise this much     6 a.) How many servings of Fruits and Vegetables do you have each day ( 1 serving = 1 piece of fruit, 1/2 cup fruits or vegetables): 1 per day     6 b.) How many servings of High Fiber / Whole Grain Foods to you have each day ( 1 serving = 1 cup cold cereal, 1/2 cup cooked cereal, 1 slice bread): 1 per day     7b.) Do you feel unsteady when standing or walking?: Yes     11b.) Bowel control problems: Sometimes     11c.) Teeth or Denture Problems: Always     11d.) Bodily pain: Always     11h.) Problems with your hearing: Often     13.) Do you need help with any of the following activities?: Do your housework or laundry     15.) How confident are you that you can control and manage most of your health problems?: Somewhat confident         Review Flowsheet  More data exists         1/9/2024   PHQ 2/9 Score   Adult PHQ 2 Score 4   Adult PHQ 2 Interpretation Further screening needed   Little interest or pleasure in activity? More than half the days   Feeling down, depressed or hopeless? More than half the days   Adult PHQ 9 Score 8   Adult PHQ 9 Interpretation  Mild Depression   Trouble falling or staying asleep or sleeping all the time? Several days   Feeling tired or having little energy? Several days   Poor appetite or overeating? Several days   Feeling bad about yourself or that you are a failure or have let yourself or family down? Several days   Trouble concentrating on things such as reading the newspaper or watching TV? Not at all   Moving or speaking slowly that other people have noticed or the opposite - being so fidgety or restless that you have been moving around a lot more than usual? Not at all   Thoughts that you would be better off dead or of hurting yourself in some way? Not at all       5 cc

## 2024-01-23 ENCOUNTER — APPOINTMENT (OUTPATIENT)
Dept: CARDIOLOGY | Facility: CLINIC | Age: 69
End: 2024-01-23
Payer: MEDICARE

## 2024-01-23 PROCEDURE — 93015 CV STRESS TEST SUPVJ I&R: CPT

## 2024-02-23 NOTE — PRE-OP CHECKLIST - NS PREOP CHK MONITOR ANESTHESIA CONSENT
[facility]  SPEECH PATHOLOGY MODIFIED BARIUM SWALLOW STUDY      Patient: Will Matthews (56 y.o. male)  Date: 2/23/2024  Primary Diagnosis: Gastroesophageal reflux disease without esophagitis [K21.9]  Dysphagia, oropharyngeal phase [R13.12]   Day of Surgery   Precautions:                      ASSESSMENT :  Based on the objective data described below, the patient presents with mild pharyngeal dysphagia.  Oral phase c/b good bolus control and manipulation, wfl mastication and timely A-P transit. R lingual deviation observed does appear to impact speech however bolus control and manipulation is not affected. He endorses R sensory deficits.  All consistencies initiate at the level of the vallecula. Overall, timely swallow initiation.   Mild reduction in BOT retraction and HLE. Reduced hyoid protraction noted w/ decreased pressure drive.  Epiglottis w/ curled morphology and limited inversion. Weight of bolus does not improve inversion.   Consistent deep laryngeal penetration during the swallow w/ thin liquids that clears w/ force of the swallow. No evidence of subsequent aspiration. W/ larger volumes and consecutive sips there is larger degree of penetration.   Trace flash penetration w/ mildly thick during the swallow.   No pen/asp observed w/ remaining consistencies.   There is mild pharyngeal residue w/ liquids. W/ pudding and solid trials there is moderate vallecula residue that reduces but doesn't fully clear w/ multiple swallow and liquid wash. Patient reports pharyngeal globus sensation consistent w/ observed vallecular residue.   There is reduced relaxation and duration of relaxation of UES s/t above deficits.   Cervical osteophyte C5-C6 that does not appear to negatively impact swallow fxn at this time.   Delayed clearance of pudding and solids through proximal esophagus   Patient at risk for aspiration and would benefit from dysphagia tx.      PLAN :  Recommendations and Planned  Interventions:  Rec easy to chew diet w/ thin liquids and strict asp/GERD precautions.  Rec slow rate of intake, effortful swallow, small bites/sips, double swallow, liquid wash, utilize condiments to moisten foods, and remain upright 30 mins after PO.   Discharge Recommendations: Rec OP ST for dysphagia tx. Rec NMES as medically appropriate.      SUBJECTIVE:   Patient reports hx of metastatic throat cancer states he underwent chemo, XRT and removal of R cervical lymph nodes. Hx of PEG that was removed following treatment. Patient endorses dysphagia w/ worsening sxs stating he is getting choked and has pharyngeal globus sensation.   Lingual deviation noted per patient present since surgery.     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  [] Other:    Presentation:   [] Spoon   [x] Cup   [x] Straw   [] Syringe   [x] Consecutive Swallows  [] Other:    Consistencies:   [x] Ba+ liquid   [x] Ba+ liquid (nectar)   [] Ba+ liquid (honey)     [x] Ba+ pudding   [] Ba+ crunched cookie   [x] Ba+ cookie   [] Other:         Swallow Physiology:  Decreased Tongue Base Retraction?: Yes  Laryngeal Elevation: Inadequate epiglottic inversion;Reduced excursion with laryngeal vestibule gap     Pharyngeal-Esophageal Segment: Decreased relaxation of upper esophageal segment  Pharyngeal Dysfunction: Crico-pharyngeal dysfunction;Decreased tongue base retraction;Decreased strength;Decreased  done

## 2024-02-27 ENCOUNTER — APPOINTMENT (OUTPATIENT)
Dept: CARDIOLOGY | Facility: CLINIC | Age: 69
End: 2024-02-27
Payer: MEDICARE

## 2024-02-27 VITALS
OXYGEN SATURATION: 97 % | HEART RATE: 87 BPM | SYSTOLIC BLOOD PRESSURE: 130 MMHG | BODY MASS INDEX: 30.65 KG/M2 | DIASTOLIC BLOOD PRESSURE: 66 MMHG | HEIGHT: 63 IN | WEIGHT: 173 LBS

## 2024-02-27 DIAGNOSIS — R94.39 ABNORMAL RESULT OF OTHER CARDIOVASCULAR FUNCTION STUDY: ICD-10-CM

## 2024-02-27 PROCEDURE — 99214 OFFICE O/P EST MOD 30 MIN: CPT

## 2024-02-27 RX ORDER — DOXAZOSIN 1 MG/1
1 TABLET ORAL
Refills: 0 | Status: ACTIVE | COMMUNITY

## 2024-02-27 NOTE — CARDIOLOGY SUMMARY
[de-identified] : Sinus  Rhythm  - occasional ectopic ventricular beat   \par  Low voltage in precordial leads. \par   -  Diffuse nonspecific T-abnormality. \par  \par  ABNORMAL \par

## 2024-02-27 NOTE — DISCUSSION/SUMMARY
[FreeTextEntry1] : 68 years of age with BMI of 30, asthma, type 2 diabetes, degenerative joint disease, abnormal ECG and intermittent palpitations of late.  She is eating generally healthy and taking medications as prescribed.  1/4/23: feeling well still has some palpitations but not as frequent. Holter: 7b SVT; no other arrhythmias  TSH/lytes normal +exercise stress test w/ inferior and inferolateral ST depressions and TWIs -cardiac CTA to further eval cardiac anatomy

## 2024-02-27 NOTE — HISTORY OF PRESENT ILLNESS
[FreeTextEntry1] : 68 years of age with BMI of 30, asthma, type 2 diabetes, degenerative joint disease, abnormal ECG and intermittent palpitations of late.  She is eating generally healthy and taking medications as prescribed.  1/4/23: feeling well still has some palpitations but not as frequent. Holter: 7b SVT; no other arrhythmias   2/27/24: +exercise stress test w/ inferior and inferolateral ST depressions and TWIs

## 2024-04-11 ENCOUNTER — APPOINTMENT (OUTPATIENT)
Dept: CT IMAGING | Facility: CLINIC | Age: 69
End: 2024-04-11

## 2024-04-11 ENCOUNTER — OUTPATIENT (OUTPATIENT)
Dept: OUTPATIENT SERVICES | Facility: HOSPITAL | Age: 69
LOS: 1 days | End: 2024-04-11
Payer: MEDICARE

## 2024-04-11 DIAGNOSIS — Z98.89 OTHER SPECIFIED POSTPROCEDURAL STATES: Chronic | ICD-10-CM

## 2024-04-11 DIAGNOSIS — R94.39 ABNORMAL RESULT OF OTHER CARDIOVASCULAR FUNCTION STUDY: ICD-10-CM

## 2024-04-11 DIAGNOSIS — Z03.89 ENCOUNTER FOR OBSERVATION FOR OTHER SUSPECTED DISEASES AND CONDITIONS RULED OUT: ICD-10-CM

## 2024-04-11 PROCEDURE — 75574 CT ANGIO HRT W/3D IMAGE: CPT | Mod: 26

## 2024-04-11 PROCEDURE — 75574 CT ANGIO HRT W/3D IMAGE: CPT

## 2024-05-07 ENCOUNTER — NON-APPOINTMENT (OUTPATIENT)
Age: 69
End: 2024-05-07

## 2024-05-08 ENCOUNTER — APPOINTMENT (OUTPATIENT)
Dept: THORACIC SURGERY | Facility: CLINIC | Age: 69
End: 2024-05-08
Payer: MEDICARE

## 2024-05-08 VITALS
WEIGHT: 171 LBS | SYSTOLIC BLOOD PRESSURE: 146 MMHG | BODY MASS INDEX: 30.3 KG/M2 | OXYGEN SATURATION: 99 % | HEART RATE: 99 BPM | HEIGHT: 63 IN | RESPIRATION RATE: 16 BRPM | DIASTOLIC BLOOD PRESSURE: 75 MMHG

## 2024-05-08 DIAGNOSIS — Z82.3 FAMILY HISTORY OF STROKE: ICD-10-CM

## 2024-05-08 DIAGNOSIS — R91.1 SOLITARY PULMONARY NODULE: ICD-10-CM

## 2024-05-08 DIAGNOSIS — Z87.891 PERSONAL HISTORY OF NICOTINE DEPENDENCE: ICD-10-CM

## 2024-05-08 PROCEDURE — 99204 OFFICE O/P NEW MOD 45 MIN: CPT

## 2024-05-08 RX ORDER — INSULIN ASPART 100 [IU]/ML
100 INJECTION, SOLUTION INTRAVENOUS; SUBCUTANEOUS
Refills: 0 | Status: ACTIVE | COMMUNITY

## 2024-05-08 RX ORDER — HUMAN INSULIN 100 [IU]/ML
(70-30) 100 INJECTION, SUSPENSION SUBCUTANEOUS
Refills: 0 | Status: ACTIVE | COMMUNITY

## 2024-05-08 RX ORDER — DOXAZOSIN 2 MG/1
2 TABLET ORAL
Refills: 0 | Status: ACTIVE | COMMUNITY

## 2024-05-08 RX ORDER — AMLODIPINE BESYLATE 5 MG/1
5 TABLET ORAL
Refills: 0 | Status: ACTIVE | COMMUNITY

## 2024-05-08 RX ORDER — LOSARTAN POTASSIUM 100 MG/1
100 TABLET, FILM COATED ORAL
Refills: 0 | Status: ACTIVE | COMMUNITY

## 2024-05-10 NOTE — DATA REVIEWED
[FreeTextEntry1] : I have independently reviewed the following: CT Angio Heart Coronary w/IV Contrast on 04/11/2024

## 2024-05-10 NOTE — ASSESSMENT
[FreeTextEntry1] : Ms. CLAUDETTE MCINTOSH, 69 year old female, former smoker (1 PPD x 10 years; Quit 40 years ago), w/ hx of DM2, asthma, degenerative joint disease, who recent had CT Angio Heart which revealing RLL nodule.   Patient is here today for CT Sx consultation, referred by Dr. Viri Arechiga (PCP).   I have independently reviewed the medical records and imaging at the time of this office consultation. CT imaging reviewed and compared to available imaging. Nodule not present on 2017 CT. However, When compared to 2023, it does seem slightly denser. This may be related to technique of the imaging. Recommend repeating a non contrast CT Chest in 6 months to re-evaluate. She is agreeable and will f/u here with the next scan to ensure stability.    Recommendations reviewed with patient during this office visit, and all questions answered; Patient instructed on the importance of follow up and verbalizes understanding.  I, DERRICK Hdz, personally performed the evaluation and management (E/M) services for this new patient. That E/M includes conducting the initial examination, assessing all conditions, and establishing the plan of care. Today, My ACP, Uma Key, was here to observe my evaluation and management services for this patient to be followed going forward.

## 2024-05-10 NOTE — CONSULT LETTER
[FreeTextEntry2] : Dr. Viri Arechiga (PCP/Ref) [FreeTextEntry3] : Travis Raza MD, FACS , Division of Thoracic Surgery [facility] Thoracic Surgery [facility] Department of Cardiovascular & Thoracic Surgery  [employer] [facility]

## 2024-05-10 NOTE — HISTORY OF PRESENT ILLNESS
[FreeTextEntry1] : Ms. CLAUDETTE MCINTOSH, 69 year old female, former smoker (1 PPD x 10 years; Quit 40 years ago), w/ hx of DM2, asthma, degenerative joint disease, who recent had CT Angio Heart which revealing RLL nodule.   Of note: 2/27/24: +exercise stress test w/ inferior and inferolateral ST depressions and TWIs  CT Angio Heart Coronary w/IV Contrast on 04/11/2024: - Stable 6 mm nodule in right lower lobe (series 13, image 73). - There is mild peribronchial wall thickening, likely inflammatory.  Patient is here today for CT Sx consultation, referred by Dr. Viri Arechiga (PCP). Overall, patient feels well.  + Shortness of breath upon exertion. Today, patient denies  chest pain, cough, hemoptysis, fever, chills, night sweats, lightheadedness or dizziness.

## 2024-05-10 NOTE — PHYSICAL EXAM
[Fully active, able to carry on all pre-disease performance without restriction] : Status 0 - Fully active, able to carry on all pre-disease performance without restriction [General Appearance - Alert] : alert [General Appearance - In No Acute Distress] : in no acute distress [General Appearance - Well Nourished] : well nourished [Sclera] : the sclera and conjunctiva were normal [Outer Ear] : the ears and nose were normal in appearance [Hearing Threshold Finger Rub Not Gulf] : hearing was normal [Both Tympanic Membranes Were Examined] : both tympanic membranes were normal [Neck Appearance] : the appearance of the neck was normal [Neck Cervical Mass (___cm)] : no neck mass was observed [] : no respiratory distress [Respiration, Rhythm And Depth] : normal respiratory rhythm and effort [Exaggerated Use Of Accessory Muscles For Inspiration] : no accessory muscle use [Auscultation Breath Sounds / Voice Sounds] : lungs were clear to auscultation bilaterally [Heart Rate And Rhythm] : heart rate was normal and rhythm regular [Examination Of The Chest] : the chest was normal in appearance [Chest Visual Inspection Thoracic Asymmetry] : no chest asymmetry [Diminished Respiratory Excursion] : normal chest expansion [2+] : left 2+ [Breast Appearance] : normal in appearance [Breast Palpation Mass] : no palpable masses [Bowel Sounds] : normal bowel sounds [Abdomen Soft] : soft [Abdomen Tenderness] : non-tender [Cervical Lymph Nodes Enlarged Posterior Bilaterally] : posterior cervical [Cervical Lymph Nodes Enlarged Anterior Bilaterally] : anterior cervical [Supraclavicular Lymph Nodes Enlarged Bilaterally] : supraclavicular [No CVA Tenderness] : no ~M costovertebral angle tenderness [No Spinal Tenderness] : no spinal tenderness [Involuntary Movements] : no involuntary movements were seen [Skin Color & Pigmentation] : normal skin color and pigmentation [No Focal Deficits] : no focal deficits [Oriented To Time, Place, And Person] : oriented to person, place, and time [FreeTextEntry1] : Deferred

## 2024-05-28 ENCOUNTER — APPOINTMENT (OUTPATIENT)
Dept: CARDIOLOGY | Facility: CLINIC | Age: 69
End: 2024-05-28
Payer: MEDICARE

## 2024-05-28 VITALS
BODY MASS INDEX: 30.12 KG/M2 | SYSTOLIC BLOOD PRESSURE: 120 MMHG | HEART RATE: 70 BPM | DIASTOLIC BLOOD PRESSURE: 82 MMHG | OXYGEN SATURATION: 96 % | WEIGHT: 170 LBS | HEIGHT: 63 IN

## 2024-05-28 DIAGNOSIS — I10 ESSENTIAL (PRIMARY) HYPERTENSION: ICD-10-CM

## 2024-05-28 DIAGNOSIS — R07.9 CHEST PAIN, UNSPECIFIED: ICD-10-CM

## 2024-05-28 DIAGNOSIS — R94.31 ABNORMAL ELECTROCARDIOGRAM [ECG] [EKG]: ICD-10-CM

## 2024-05-28 PROCEDURE — G2211 COMPLEX E/M VISIT ADD ON: CPT

## 2024-05-28 PROCEDURE — 99214 OFFICE O/P EST MOD 30 MIN: CPT

## 2024-05-28 NOTE — HISTORY OF PRESENT ILLNESS
[FreeTextEntry1] : 68 years of age with BMI of 30, asthma, type 2 diabetes, degenerative joint disease, abnormal ECG and intermittent palpitations of late.  She is eating generally healthy and taking medications as prescribed.  1/4/23: feeling well still has some palpitations but not as frequent. Holter: 7b SVT; no other arrhythmias   2/27/24: +exercise stress test w/ inferior and inferolateral ST depressions and TWIs  5/28/24: feeling well CTA w/ nl cors and calcium score 0

## 2024-05-28 NOTE — DISCUSSION/SUMMARY
[FreeTextEntry1] : 68yo lady with a PMH of asthma, type 2 diabetes, degenerative joint disease, abnormal ECG and intermittent palpitations of late.  She is eating generally healthy and taking medications as prescribed. Holter: 7b SVT; no other arrhythmias  TSH/lytes normal +exercise stress test w/ inferior and inferolateral ST depressions and TWIs CTA w/ nl cors and calcium score 0 Cont diet and exercise; current meds. F/U in 6 months

## 2024-05-28 NOTE — CARDIOLOGY SUMMARY
[de-identified] : Sinus  Rhythm  - occasional ectopic ventricular beat   \par  Low voltage in precordial leads. \par   -  Diffuse nonspecific T-abnormality. \par  \par  ABNORMAL \par

## 2024-09-11 ENCOUNTER — NON-APPOINTMENT (OUTPATIENT)
Age: 69
End: 2024-09-11

## 2024-09-12 ENCOUNTER — APPOINTMENT (OUTPATIENT)
Dept: CARDIOLOGY | Facility: CLINIC | Age: 69
End: 2024-09-12
Payer: MEDICARE

## 2024-09-12 ENCOUNTER — NON-APPOINTMENT (OUTPATIENT)
Age: 69
End: 2024-09-12

## 2024-09-12 VITALS
OXYGEN SATURATION: 97 % | HEIGHT: 63 IN | DIASTOLIC BLOOD PRESSURE: 88 MMHG | SYSTOLIC BLOOD PRESSURE: 126 MMHG | WEIGHT: 172 LBS | HEART RATE: 96 BPM | BODY MASS INDEX: 30.48 KG/M2

## 2024-09-12 DIAGNOSIS — Z01.818 ENCOUNTER FOR OTHER PREPROCEDURAL EXAMINATION: ICD-10-CM

## 2024-09-12 DIAGNOSIS — I10 ESSENTIAL (PRIMARY) HYPERTENSION: ICD-10-CM

## 2024-09-12 PROCEDURE — G2211 COMPLEX E/M VISIT ADD ON: CPT | Mod: NC

## 2024-09-12 PROCEDURE — 93000 ELECTROCARDIOGRAM COMPLETE: CPT

## 2024-09-12 PROCEDURE — 99214 OFFICE O/P EST MOD 30 MIN: CPT | Mod: 25

## 2024-09-12 NOTE — PHYSICAL EXAM
[Well Developed] : well developed [Well Nourished] : well nourished [No Acute Distress] : no acute distress [Normal Venous Pressure] : normal venous pressure [No Carotid Bruit] : no carotid bruit [Normal S1, S2] : normal S1, S2 [No Murmur] : no murmur [No Rub] : no rub [No Gallop] : no gallop [Clear Lung Fields] : clear lung fields [Good Air Entry] : good air entry [No Respiratory Distress] : no respiratory distress  [Normal Gait] : normal gait [No Edema] : no edema [No Cyanosis] : no cyanosis [No Clubbing] : no clubbing [No Varicosities] : no varicosities [No Rash] : no rash [No Skin Lesions] : no skin lesions [Moves all extremities] : moves all extremities [No Focal Deficits] : no focal deficits [Normal Speech] : normal speech [Alert and Oriented] : alert and oriented [Normal memory] : normal memory

## 2024-09-13 NOTE — HISTORY OF PRESENT ILLNESS
[Preoperative Visit] : for a medical evaluation prior to surgery [Scheduled Procedure ___] : a [unfilled] [Date of Surgery ___] : on [unfilled] [Surgeon Name ___] : surgeon: [unfilled] [Good] : Good [Cardiovascular Disease] : cardiovascular disease [Electrocardiogram] : ~T an ECG ~C was performed [Unable to Assess] : Unable to Assess [Fever] : no fever [Chills] : no chills [Fatigue] : no fatigue [Chest Pain] : no chest pain [Cough] : no cough [Dyspnea] : no dyspnea [Pulmonary Disease] : no pulmonary disease [Anti-Platelet Agents] : no anti-platelet agents [Nicotine Dependence] : no nicotine dependence [Anesthesia Reaction] : no anesthesia reaction [Sudden Death] : no sudden death [Clotting Disorder] : no clotting disorder [Bleeding Disorder] : no bleeding disorder [de-identified] : Fax#  [FreeTextEntry1] : Claudette is 68 years of age with history of type 2 diabetes, asthma, degenerative joint disease.  She has difficulty with ambulation secondary to cervical radiculopathy, cervical & lumbar spinal syndrome, overweight by BMI who comes in for cardiac assessment. She has an abnormal ECG possible old anterior wall MI age-indeterminate and low voltage QRS with nonspecific T wave abnormality. She also complains of palpitations.  At times, chest pain also noted.  Recently evaluated by Nephrologist for CKD evaluation. Medication Triamterne-HCTZ due to elevation in in BUN and Cr.   09/12/2024 Pt is here for a cardiac clearance for a scheduled upped endoscopy with Dr. Sanders on sept 16th, 2024 Has been complaining of SOB, noted right lower lobe nodule on most recent CTA which she followed up with pulmonologist , but otherwise cardiac testing appears to be negative for ischemia, is to schedule an echo for SOB when exerting herself,  as well as most recent echo was completed in 2022. Will be following up with nephrology in october

## 2024-09-13 NOTE — CARDIOLOGY SUMMARY
[___] : [unfilled] [LVEF ___%] : LVEF [unfilled]% [de-identified] : 09/12/2024: Sinus rhythm with diffuse nonspecific t-abnormality  [de-identified] : *  No evidence of a stenosis or atherosclerotic plaque in coronary vessels. Calcium score is 0. *  Stable 6 mm pulmonary nodule in right lower lobe. *  Mild peribronchial wall thickening, likely inflammatory.

## 2024-09-13 NOTE — DISCUSSION/SUMMARY
[Procedure Low Risk] : the procedure risk is low [Patient Low Risk] : the patient is a low surgical risk [Optimized for Surgery] : the patient is optimized for surgery [As per surgery] : as per surgery [Continue] : Continue medications as currently directed [FreeTextEntry1] : Pt is cleared from a cardiology standpoint to proceed with the upper endoscopy; no cardiac ontraindication. stop asa 7 days prior to procedure [EKG obtained to assist in diagnosis and management of assessed problem(s)] : EKG obtained to assist in diagnosis and management of assessed problem(s)

## 2024-09-13 NOTE — HISTORY OF PRESENT ILLNESS
[Preoperative Visit] : for a medical evaluation prior to surgery [Scheduled Procedure ___] : a [unfilled] [Date of Surgery ___] : on [unfilled] [Surgeon Name ___] : surgeon: [unfilled] [Good] : Good [Cardiovascular Disease] : cardiovascular disease [Electrocardiogram] : ~T an ECG ~C was performed [Unable to Assess] : Unable to Assess [Fever] : no fever [Chills] : no chills [Fatigue] : no fatigue [Chest Pain] : no chest pain [Cough] : no cough [Dyspnea] : no dyspnea [Pulmonary Disease] : no pulmonary disease [Anti-Platelet Agents] : no anti-platelet agents [Nicotine Dependence] : no nicotine dependence [Anesthesia Reaction] : no anesthesia reaction [Sudden Death] : no sudden death [Clotting Disorder] : no clotting disorder [Bleeding Disorder] : no bleeding disorder [de-identified] : Fax#  [FreeTextEntry1] : Claudette is 68 years of age with history of type 2 diabetes, asthma, degenerative joint disease.  She has difficulty with ambulation secondary to cervical radiculopathy, cervical & lumbar spinal syndrome, overweight by BMI who comes in for cardiac assessment. She has an abnormal ECG possible old anterior wall MI age-indeterminate and low voltage QRS with nonspecific T wave abnormality. She also complains of palpitations.  At times, chest pain also noted.  Recently evaluated by Nephrologist for CKD evaluation. Medication Triamterne-HCTZ due to elevation in in BUN and Cr.   09/12/2024 Pt is here for a cardiac clearance for a scheduled upped endoscopy with Dr. Sanders on sept 16th, 2024 Has been complaining of SOB, noted right lower lobe nodule on most recent CTA which she followed up with pulmonologist , but otherwise cardiac testing appears to be negative for ischemia, is to schedule an echo for SOB when exerting herself,  as well as most recent echo was completed in 2022. Will be following up with nephrology in october

## 2024-09-13 NOTE — CARDIOLOGY SUMMARY
[___] : [unfilled] [LVEF ___%] : LVEF [unfilled]% [de-identified] : 09/12/2024: Sinus rhythm with diffuse nonspecific t-abnormality  [de-identified] : *  No evidence of a stenosis or atherosclerotic plaque in coronary vessels. Calcium score is 0. *  Stable 6 mm pulmonary nodule in right lower lobe. *  Mild peribronchial wall thickening, likely inflammatory.

## 2024-09-13 NOTE — ASSESSMENT
[FreeTextEntry1] : Today's blood pressure reading was slightly elevated. Patient medication was changed in light of the new CKD by Nephrology. Will re-evaluate the patient in 3 months after completing colonoscopy for reassessment of blood pressure.  Patient will be reevaluated in 3 months or sooner if needed. Thank you for allowing us to participate in the care of this patient.

## 2024-09-16 NOTE — DIETITIAN INITIAL EVALUATION ADULT - NSICDXPASTMEDICALHX_GEN_ALL_CORE_FT
No
PAST MEDICAL HISTORY:  Allergic asthma     DM (diabetes mellitus)     HTN (hypertension)     Hypertension     Type 2 diabetes mellitus

## 2024-09-24 DIAGNOSIS — I10 ESSENTIAL (PRIMARY) HYPERTENSION: ICD-10-CM

## 2024-09-27 ENCOUNTER — APPOINTMENT (OUTPATIENT)
Dept: CARDIOLOGY | Facility: CLINIC | Age: 69
End: 2024-09-27

## 2024-11-13 ENCOUNTER — APPOINTMENT (OUTPATIENT)
Dept: THORACIC SURGERY | Facility: CLINIC | Age: 69
End: 2024-11-13
Payer: MEDICARE

## 2024-11-13 ENCOUNTER — NON-APPOINTMENT (OUTPATIENT)
Age: 69
End: 2024-11-13

## 2024-11-13 DIAGNOSIS — R91.1 SOLITARY PULMONARY NODULE: ICD-10-CM

## 2024-11-13 PROCEDURE — 99443: CPT | Mod: 93

## 2024-11-26 ENCOUNTER — NON-APPOINTMENT (OUTPATIENT)
Age: 69
End: 2024-11-26

## 2024-11-26 ENCOUNTER — APPOINTMENT (OUTPATIENT)
Dept: CARDIOLOGY | Facility: CLINIC | Age: 69
End: 2024-11-26
Payer: MEDICARE

## 2024-11-26 VITALS
WEIGHT: 170 LBS | RESPIRATION RATE: 16 BRPM | SYSTOLIC BLOOD PRESSURE: 122 MMHG | HEIGHT: 63 IN | HEART RATE: 90 BPM | DIASTOLIC BLOOD PRESSURE: 70 MMHG | BODY MASS INDEX: 30.12 KG/M2 | OXYGEN SATURATION: 96 %

## 2024-11-26 DIAGNOSIS — R00.2 PALPITATIONS: ICD-10-CM

## 2024-11-26 PROCEDURE — 93000 ELECTROCARDIOGRAM COMPLETE: CPT | Mod: 59

## 2024-11-26 PROCEDURE — 99214 OFFICE O/P EST MOD 30 MIN: CPT | Mod: 25

## 2024-11-26 RX ORDER — LOSARTAN POTASSIUM 100 MG/1
100 TABLET, FILM COATED ORAL
Refills: 0 | Status: ACTIVE | COMMUNITY

## 2024-11-26 RX ORDER — AMLODIPINE BESYLATE 10 MG/1
10 TABLET ORAL
Refills: 0 | Status: ACTIVE | COMMUNITY

## 2024-11-26 RX ORDER — PANTOPRAZOLE 40 MG/1
40 TABLET, DELAYED RELEASE ORAL
Qty: 90 | Refills: 3 | Status: ACTIVE | COMMUNITY
Start: 2024-11-26 | End: 1900-01-01

## 2024-12-30 NOTE — PATIENT PROFILE ADULT - DOES PATIENT HAVE ADVANCE DIRECTIVE
Copied from CRM #[id number]. Topic: MW Messaging - MW Patient Request  >> Dec 30, 2024  2:30 PM Anabelle MARCELO wrote:  Tara Juárez called during clinic working hours to follow up on previous message from the clinic.     Returning same day call. Communication history located but with no actionable instructions from Care Site. Sent secure chat and chat NOT answered by site.     Selected 'Wrap Up CRM' and chose appropriate open Telephone Encounter after clicking 'Convert to Clinical Call'. Routed as routine priority per Clinician KB page to appropriate clinician pool. No answer from Site Clinic Direct Line. Followed site leadership escalation process.   No

## 2025-01-08 ENCOUNTER — APPOINTMENT (OUTPATIENT)
Dept: CARDIOLOGY | Facility: CLINIC | Age: 70
End: 2025-01-08

## 2025-01-08 DIAGNOSIS — R06.02 SHORTNESS OF BREATH: ICD-10-CM

## 2025-03-21 ENCOUNTER — NON-APPOINTMENT (OUTPATIENT)
Age: 70
End: 2025-03-21

## 2025-03-21 ENCOUNTER — APPOINTMENT (OUTPATIENT)
Dept: CARDIOLOGY | Facility: CLINIC | Age: 70
End: 2025-03-21
Payer: MEDICARE

## 2025-03-21 VITALS
WEIGHT: 169 LBS | BODY MASS INDEX: 29.95 KG/M2 | RESPIRATION RATE: 16 BRPM | HEART RATE: 94 BPM | SYSTOLIC BLOOD PRESSURE: 122 MMHG | OXYGEN SATURATION: 96 % | DIASTOLIC BLOOD PRESSURE: 70 MMHG | HEIGHT: 63 IN

## 2025-03-21 DIAGNOSIS — R94.31 ABNORMAL ELECTROCARDIOGRAM [ECG] [EKG]: ICD-10-CM

## 2025-03-21 DIAGNOSIS — I10 ESSENTIAL (PRIMARY) HYPERTENSION: ICD-10-CM

## 2025-03-21 DIAGNOSIS — R07.9 CHEST PAIN, UNSPECIFIED: ICD-10-CM

## 2025-03-21 PROCEDURE — 99214 OFFICE O/P EST MOD 30 MIN: CPT | Mod: 25

## 2025-03-21 PROCEDURE — 93000 ELECTROCARDIOGRAM COMPLETE: CPT

## (undated) DEVICE — GLV 7.5 PROTEXIS (WHITE)

## (undated) DEVICE — DRSG ALLEVYN LIFE 3X3"

## (undated) DEVICE — PACK GENERAL LAPAROSCOPY

## (undated) DEVICE — WARMING BLANKET UPPER ADULT

## (undated) DEVICE — TROCAR COVIDIEN VERSAONE BLADELESS FIXATION 12MM STANDARD

## (undated) DEVICE — DRAPE 3/4 SHEET W REINFORCEMENT 56X77"

## (undated) DEVICE — SOL IRR BAG NS 0.9% 3000ML

## (undated) DEVICE — DRSG MASTISOL

## (undated) DEVICE — ENDOCATCH 10MM SPECIMEN POUCH

## (undated) DEVICE — DRAPE TOWEL BLUE 17" X 24"

## (undated) DEVICE — VENODYNE/SCD SLEEVE CALF MEDIUM

## (undated) DEVICE — SUT VICRYL 0 27" UR-6

## (undated) DEVICE — TROCAR COVIDIEN VERSAONE BLADELESS FIXATION 11MM STANDARD

## (undated) DEVICE — D HELP - CLEARVIEW CLEARIFY SYSTEM

## (undated) DEVICE — DRSG STERISTRIPS 0.5 X 4"

## (undated) DEVICE — FRA-ESU BOVIE FORCE TRIAD T7J19745DX: Type: DURABLE MEDICAL EQUIPMENT

## (undated) DEVICE — TROCAR COVIDIEN VERSAONE BLADELESS FIXATION 5MM STANDARD

## (undated) DEVICE — SUT MONOCRYL 4-0 27" PS-2 UNDYED

## (undated) DEVICE — GLV 7 PROTEXIS (WHITE)

## (undated) DEVICE — TUBING STRYKEFLOW II SUCTION / IRRIGATOR

## (undated) DEVICE — TUBING STRYKER PNEUMOCLEAR SMOKE HEAT HUMID

## (undated) DEVICE — TROCAR APPLIED MEDICAL KII BALLOON BLUNT TIP 12MM X 100MM

## (undated) DEVICE — DRSG 2X2